# Patient Record
Sex: FEMALE | Race: OTHER | HISPANIC OR LATINO | URBAN - METROPOLITAN AREA
[De-identification: names, ages, dates, MRNs, and addresses within clinical notes are randomized per-mention and may not be internally consistent; named-entity substitution may affect disease eponyms.]

---

## 2019-01-15 VITALS
HEART RATE: 91 BPM | HEIGHT: 59 IN | OXYGEN SATURATION: 96 % | RESPIRATION RATE: 20 BRPM | DIASTOLIC BLOOD PRESSURE: 77 MMHG | SYSTOLIC BLOOD PRESSURE: 151 MMHG | TEMPERATURE: 98 F | WEIGHT: 164.69 LBS

## 2019-01-15 NOTE — PATIENT PROFILE ADULT - LANGUAGE ASSISTANCE NEEDED
Yes-Patient/Caregiver accepts free interpretation services... Yes-Patient/Caregiver accepts free interpretation services.../Profile completed with  Freddy (ID [id number])

## 2019-01-23 ENCOUNTER — INPATIENT (INPATIENT)
Facility: HOSPITAL | Age: 79
LOS: 4 days | Discharge: HOME | DRG: 470 | End: 2019-01-28
Payer: MEDICARE

## 2019-01-23 DIAGNOSIS — I10 ESSENTIAL (PRIMARY) HYPERTENSION: ICD-10-CM

## 2019-01-23 DIAGNOSIS — M17.12 UNILATERAL PRIMARY OSTEOARTHRITIS, LEFT KNEE: ICD-10-CM

## 2019-01-23 LAB
APTT BLD: 30.6 SEC — SIGNIFICANT CHANGE UP (ref 27.5–36.3)
MRSA PCR RESULT.: NEGATIVE — SIGNIFICANT CHANGE UP
S AUREUS DNA NOSE QL NAA+PROBE: NEGATIVE — SIGNIFICANT CHANGE UP

## 2019-01-23 PROCEDURE — 73560 X-RAY EXAM OF KNEE 1 OR 2: CPT | Mod: 26,LT

## 2019-01-23 RX ORDER — ACETAMINOPHEN 500 MG
650 TABLET ORAL EVERY 6 HOURS
Qty: 0 | Refills: 0 | Status: DISCONTINUED | OUTPATIENT
Start: 2019-01-23 | End: 2019-01-28

## 2019-01-23 RX ORDER — SODIUM CHLORIDE 9 MG/ML
1000 INJECTION, SOLUTION INTRAVENOUS
Qty: 0 | Refills: 0 | Status: DISCONTINUED | OUTPATIENT
Start: 2019-01-23 | End: 2019-01-28

## 2019-01-23 RX ORDER — CEFAZOLIN SODIUM 1 G
2000 VIAL (EA) INJECTION EVERY 8 HOURS
Qty: 0 | Refills: 0 | Status: DISCONTINUED | OUTPATIENT
Start: 2019-01-23 | End: 2019-01-23

## 2019-01-23 RX ORDER — ONDANSETRON 8 MG/1
4 TABLET, FILM COATED ORAL EVERY 6 HOURS
Qty: 0 | Refills: 0 | Status: DISCONTINUED | OUTPATIENT
Start: 2019-01-23 | End: 2019-01-28

## 2019-01-23 RX ORDER — POLYETHYLENE GLYCOL 3350 17 G/17G
17 POWDER, FOR SOLUTION ORAL DAILY
Qty: 0 | Refills: 0 | Status: DISCONTINUED | OUTPATIENT
Start: 2019-01-23 | End: 2019-01-28

## 2019-01-23 RX ORDER — SENNA PLUS 8.6 MG/1
2 TABLET ORAL AT BEDTIME
Qty: 0 | Refills: 0 | Status: DISCONTINUED | OUTPATIENT
Start: 2019-01-23 | End: 2019-01-28

## 2019-01-23 RX ORDER — CEFAZOLIN SODIUM 1 G
2000 VIAL (EA) INJECTION EVERY 8 HOURS
Qty: 0 | Refills: 0 | Status: COMPLETED | OUTPATIENT
Start: 2019-01-23 | End: 2019-01-24

## 2019-01-23 RX ORDER — AMLODIPINE BESYLATE 2.5 MG/1
10 TABLET ORAL DAILY
Qty: 0 | Refills: 0 | Status: DISCONTINUED | OUTPATIENT
Start: 2019-01-23 | End: 2019-01-28

## 2019-01-23 RX ORDER — HYDROMORPHONE HYDROCHLORIDE 2 MG/ML
0.5 INJECTION INTRAMUSCULAR; INTRAVENOUS; SUBCUTANEOUS EVERY 4 HOURS
Qty: 0 | Refills: 0 | Status: DISCONTINUED | OUTPATIENT
Start: 2019-01-23 | End: 2019-01-28

## 2019-01-23 RX ORDER — MAGNESIUM HYDROXIDE 400 MG/1
30 TABLET, CHEWABLE ORAL DAILY
Qty: 0 | Refills: 0 | Status: DISCONTINUED | OUTPATIENT
Start: 2019-01-23 | End: 2019-01-28

## 2019-01-23 RX ORDER — HYDROCHLOROTHIAZIDE 25 MG
100 TABLET ORAL DAILY
Qty: 0 | Refills: 0 | Status: DISCONTINUED | OUTPATIENT
Start: 2019-01-23 | End: 2019-01-28

## 2019-01-23 RX ORDER — LOSARTAN POTASSIUM 100 MG/1
12.5 TABLET, FILM COATED ORAL DAILY
Qty: 0 | Refills: 0 | Status: DISCONTINUED | OUTPATIENT
Start: 2019-01-23 | End: 2019-01-28

## 2019-01-23 RX ORDER — ASPIRIN/CALCIUM CARB/MAGNESIUM 324 MG
325 TABLET ORAL
Qty: 0 | Refills: 0 | Status: DISCONTINUED | OUTPATIENT
Start: 2019-01-23 | End: 2019-01-28

## 2019-01-23 RX ORDER — HYDROMORPHONE HYDROCHLORIDE 2 MG/ML
0.5 INJECTION INTRAMUSCULAR; INTRAVENOUS; SUBCUTANEOUS
Qty: 0 | Refills: 0 | Status: DISCONTINUED | OUTPATIENT
Start: 2019-01-23 | End: 2019-01-28

## 2019-01-23 RX ORDER — AMLODIPINE BESYLATE 2.5 MG/1
1 TABLET ORAL
Qty: 0 | Refills: 0 | COMMUNITY

## 2019-01-23 RX ORDER — DOCUSATE SODIUM 100 MG
100 CAPSULE ORAL THREE TIMES A DAY
Qty: 0 | Refills: 0 | Status: DISCONTINUED | OUTPATIENT
Start: 2019-01-23 | End: 2019-01-28

## 2019-01-23 RX ORDER — OXYCODONE AND ACETAMINOPHEN 5; 325 MG/1; MG/1
1 TABLET ORAL EVERY 4 HOURS
Qty: 0 | Refills: 0 | Status: DISCONTINUED | OUTPATIENT
Start: 2019-01-23 | End: 2019-01-28

## 2019-01-23 RX ORDER — PANTOPRAZOLE SODIUM 20 MG/1
40 TABLET, DELAYED RELEASE ORAL
Qty: 0 | Refills: 0 | Status: DISCONTINUED | OUTPATIENT
Start: 2019-01-23 | End: 2019-01-28

## 2019-01-23 RX ADMIN — HYDROMORPHONE HYDROCHLORIDE 0.5 MILLIGRAM(S): 2 INJECTION INTRAMUSCULAR; INTRAVENOUS; SUBCUTANEOUS at 17:19

## 2019-01-23 RX ADMIN — HYDROMORPHONE HYDROCHLORIDE 0.5 MILLIGRAM(S): 2 INJECTION INTRAMUSCULAR; INTRAVENOUS; SUBCUTANEOUS at 22:15

## 2019-01-23 RX ADMIN — HYDROMORPHONE HYDROCHLORIDE 0.5 MILLIGRAM(S): 2 INJECTION INTRAMUSCULAR; INTRAVENOUS; SUBCUTANEOUS at 14:30

## 2019-01-23 RX ADMIN — HYDROMORPHONE HYDROCHLORIDE 0.5 MILLIGRAM(S): 2 INJECTION INTRAMUSCULAR; INTRAVENOUS; SUBCUTANEOUS at 15:00

## 2019-01-23 RX ADMIN — HYDROMORPHONE HYDROCHLORIDE 0.5 MILLIGRAM(S): 2 INJECTION INTRAMUSCULAR; INTRAVENOUS; SUBCUTANEOUS at 17:27

## 2019-01-23 RX ADMIN — HYDROMORPHONE HYDROCHLORIDE 0.5 MILLIGRAM(S): 2 INJECTION INTRAMUSCULAR; INTRAVENOUS; SUBCUTANEOUS at 21:56

## 2019-01-23 RX ADMIN — Medication 100 MILLIGRAM(S): at 21:56

## 2019-01-23 RX ADMIN — Medication 2000 MILLIGRAM(S): at 18:00

## 2019-01-23 RX ADMIN — Medication 325 MILLIGRAM(S): at 18:50

## 2019-01-23 NOTE — H&P ADULT - NSHPLABSRESULTS_GEN_ALL_CORE
Preop CBC/CMP/PT//INR/UA - WNL, reviewed by medical clearance  PTT - day of surgery   Preop EKG - WNL, NSR reviewed by medical clearance  CXR - WNL reviewed by medical clearance  Nasal swab - day of surgery

## 2019-01-23 NOTE — PHYSICAL THERAPY INITIAL EVALUATION ADULT - CRITERIA FOR SKILLED THERAPEUTIC INTERVENTIONS
anticipated equipment needs at discharge/anticipated discharge recommendation/impairments found/therapy frequency/predicted duration of therapy intervention/functional limitations in following categories/risk reduction/prevention

## 2019-01-23 NOTE — H&P ADULT - PROBLEM SELECTOR PLAN 1
Admit to Orthopaedic Service.  Presents today for elective left total knee replacement   Pt medically stable and cleared for procedure today by Dr. Salgado

## 2019-01-23 NOTE — PHYSICAL THERAPY INITIAL EVALUATION ADULT - GAIT DEVIATIONS NOTED, PT EVAL
decreased velocity of limb motion/decreased step length/decreased stride length/increased time in double stance/decreased swing-to-stance ratio

## 2019-01-23 NOTE — H&P ADULT - HISTORY OF PRESENT ILLNESS
78 year old female presents with left knee pain x 10 years. The patient states preceding accident/injury/trauma to the knee and states that her symptoms are due to arthritis. The patient complains of pain that is localized to her left knee and denies radiation of pain. She states that she occasionally has numbness/tingling in her right leg but denies numbness/tingling down her left lower extremity. The patient ambulates with a cane. Denies hx of DVT.   Presents today for elective left total knee replacement.

## 2019-01-23 NOTE — PHYSICAL THERAPY INITIAL EVALUATION ADULT - PERTINENT HX OF CURRENT PROBLEM, REHAB EVAL
78 year old female presents with left knee pain x 10 years. The patient states preceding accident/injury/trauma to the knee and states that her symptoms are due to arthritis. The patient complains of pain that is localized to her left knee and denies radiation of pain. She states that she occasionally has numbness/tingling in her right leg but denies numbness/tingling down her left lower extremity. The patient ambulates with a cane. Denies hx of DVT. Now s/p left TKR.

## 2019-01-23 NOTE — PHYSICAL THERAPY INITIAL EVALUATION ADULT - PLANNED THERAPY INTERVENTIONS, PT EVAL
balance training/bed mobility training/strengthening/ROM/transfer training/gait training/postural re-education

## 2019-01-23 NOTE — H&P ADULT - NSHPPHYSICALEXAM_GEN_ALL_CORE
Gen: NAD  MSK: No deformity or open wounds. No rashes or lesions. EHL/TA/GS/FHL 5/5 BLE. Sensation intact and equal BLE. Skin warm and well perfused. DP palpable BLE. Decreased range of motion left knee secondary to pain.  Remainder of physical exam as per medical clearance note

## 2019-01-23 NOTE — PROGRESS NOTE ADULT - SUBJECTIVE AND OBJECTIVE BOX
Orthopedics Post Op Check    Procedure: Dr. Nye  Surgeon: left TKA     Pt comfortable, without complaints. Pain well controlled in PACU   Denies CP, SOB, N/V, numbness/tingling     Vital Signs Last 24 Hrs  T(C): 36.1 (23 Jan 2019 12:28), Max: 36.1 (23 Jan 2019 12:28)  T(F): 97 (23 Jan 2019 12:28), Max: 97 (23 Jan 2019 12:28)  HR: 92 (23 Jan 2019 14:53) (68 - 98)  BP: 145/73 (23 Jan 2019 14:53) (130/60 - 163/88)  BP(mean): 103 (23 Jan 2019 14:53) (87 - 119)  RR: 11 (23 Jan 2019 14:53) (11 - 19)  SpO2: 96% (23 Jan 2019 14:53) (96% - 100%)  AVSS, NAD      General: Pt Alert and oriented, comfortable  Dressing C/D/I, cryocuff in place  Sensation intact and equal distally BLE   Motor ehl/ta/gs/fhl 5/5 BLE   Skin warm and well perfused    Post op XR: s/p left TKA hardware in place    A/P: 78yFemale POD#0 s/p left TKA doing well in PACU   - Stable  - Pain Control  - DVT ppx: ASA BID   - Rufina-op abx: Ancef  - PT, WBS: WBAT   - F/U AM Labs

## 2019-01-23 NOTE — BRIEF OPERATIVE NOTE - PROCEDURE
<<-----Click on this checkbox to enter Procedure Total knee arthroplasty  01/23/2019    Active  [id number]

## 2019-01-23 NOTE — PHYSICAL THERAPY INITIAL EVALUATION ADULT - ADDITIONAL COMMENTS
Patient lives with daughter in private house with steps. Ambulates with SC at baseline. Denies falls.

## 2019-01-23 NOTE — PHYSICAL THERAPY INITIAL EVALUATION ADULT - MODALITIES TREATMENT COMMENTS
Supine, bilateral LEs: glute sets, quad sets, heel slides, ankle pumps (all through full range, 1 set, 10 reps). Patient tolerated therex well. Educated patient to perform therex regimen 3-5x/day, patient verbalized understanding; written handout provided.

## 2019-01-24 LAB
ANION GAP SERPL CALC-SCNC: 12 MMOL/L — SIGNIFICANT CHANGE UP (ref 5–17)
BUN SERPL-MCNC: 22 MG/DL — SIGNIFICANT CHANGE UP (ref 7–23)
CALCIUM SERPL-MCNC: 9 MG/DL — SIGNIFICANT CHANGE UP (ref 8.4–10.5)
CHLORIDE SERPL-SCNC: 102 MMOL/L — SIGNIFICANT CHANGE UP (ref 96–108)
CO2 SERPL-SCNC: 26 MMOL/L — SIGNIFICANT CHANGE UP (ref 22–31)
CREAT SERPL-MCNC: 0.82 MG/DL — SIGNIFICANT CHANGE UP (ref 0.5–1.3)
GLUCOSE SERPL-MCNC: 146 MG/DL — HIGH (ref 70–99)
HCT VFR BLD CALC: 33.6 % — LOW (ref 34.5–45)
HGB BLD-MCNC: 10.8 G/DL — LOW (ref 11.5–15.5)
MCHC RBC-ENTMCNC: 27.1 PG — SIGNIFICANT CHANGE UP (ref 27–34)
MCHC RBC-ENTMCNC: 32.1 G/DL — SIGNIFICANT CHANGE UP (ref 32–36)
MCV RBC AUTO: 84.4 FL — SIGNIFICANT CHANGE UP (ref 80–100)
PLATELET # BLD AUTO: 270 K/UL — SIGNIFICANT CHANGE UP (ref 150–400)
POTASSIUM SERPL-MCNC: 3.8 MMOL/L — SIGNIFICANT CHANGE UP (ref 3.5–5.3)
POTASSIUM SERPL-SCNC: 3.8 MMOL/L — SIGNIFICANT CHANGE UP (ref 3.5–5.3)
RBC # BLD: 3.98 M/UL — SIGNIFICANT CHANGE UP (ref 3.8–5.2)
RBC # FLD: 14.4 % — SIGNIFICANT CHANGE UP (ref 10.3–16.9)
SODIUM SERPL-SCNC: 140 MMOL/L — SIGNIFICANT CHANGE UP (ref 135–145)
SURGICAL PATHOLOGY STUDY: SIGNIFICANT CHANGE UP
WBC # BLD: 15.6 K/UL — HIGH (ref 3.8–10.5)
WBC # FLD AUTO: 15.6 K/UL — HIGH (ref 3.8–10.5)

## 2019-01-24 RX ADMIN — HYDROMORPHONE HYDROCHLORIDE 0.5 MILLIGRAM(S): 2 INJECTION INTRAMUSCULAR; INTRAVENOUS; SUBCUTANEOUS at 15:43

## 2019-01-24 RX ADMIN — OXYCODONE AND ACETAMINOPHEN 1 TABLET(S): 5; 325 TABLET ORAL at 02:26

## 2019-01-24 RX ADMIN — OXYCODONE AND ACETAMINOPHEN 1 TABLET(S): 5; 325 TABLET ORAL at 13:43

## 2019-01-24 RX ADMIN — OXYCODONE AND ACETAMINOPHEN 1 TABLET(S): 5; 325 TABLET ORAL at 03:27

## 2019-01-24 RX ADMIN — Medication 100 MILLIGRAM(S): at 06:01

## 2019-01-24 RX ADMIN — Medication 325 MILLIGRAM(S): at 18:01

## 2019-01-24 RX ADMIN — OXYCODONE AND ACETAMINOPHEN 1 TABLET(S): 5; 325 TABLET ORAL at 14:43

## 2019-01-24 RX ADMIN — Medication 100 MILLIGRAM(S): at 21:18

## 2019-01-24 RX ADMIN — HYDROMORPHONE HYDROCHLORIDE 0.5 MILLIGRAM(S): 2 INJECTION INTRAMUSCULAR; INTRAVENOUS; SUBCUTANEOUS at 15:56

## 2019-01-24 RX ADMIN — Medication 325 MILLIGRAM(S): at 06:01

## 2019-01-24 RX ADMIN — LOSARTAN POTASSIUM 12.5 MILLIGRAM(S): 100 TABLET, FILM COATED ORAL at 06:00

## 2019-01-24 RX ADMIN — POLYETHYLENE GLYCOL 3350 17 GRAM(S): 17 POWDER, FOR SOLUTION ORAL at 13:41

## 2019-01-24 RX ADMIN — PANTOPRAZOLE SODIUM 40 MILLIGRAM(S): 20 TABLET, DELAYED RELEASE ORAL at 06:01

## 2019-01-24 RX ADMIN — Medication 2000 MILLIGRAM(S): at 02:07

## 2019-01-24 NOTE — DISCHARGE NOTE ADULT - PATIENT PORTAL LINK FT
You can access the [facility] Patient Portal, offered by [facility], by registering with the following website: [email or web address]

## 2019-01-24 NOTE — DISCHARGE NOTE ADULT - CARE PLAN
Principal Discharge DX:	Primary osteoarthritis of left knee  Goal:	improvement s/p Left TKR  Assessment and plan of treatment:	Weight bear as tolerated with assistive device.  No strenuous activity, heavy lifting, driving or returning to work until cleared by MD.  You may shower - dressing is water-resistant, no soaking in bathtubs.  Remove dressing after post op day 5-7, then leave incision open to air. Keep incision clean and dry.  Try to have regular bowel movements, take stool softener or laxative if necessary.  May take Pepcid or Zantac for upset stomach.  Swelling may travel all the way down leg to foot, this is normal and will subside in a few weeks.  Call to schedule an appt with Dr. Nye for follow up, if you have staples or sutures they will be removed in office.  Contact your doctor if you experience: fever greater than 101.5, chills, chest pain, difficulty breathing, redness or excessive drainage around the incision, other concerns.  Follow up with your primary care provider. Principal Discharge DX:	Primary osteoarthritis of left knee  Goal:	improvement s/p Left TKR  Assessment and plan of treatment:	Weight bear as tolerated with assistive device.  No strenuous activity, heavy lifting, driving or returning to work until cleared by MD.  You may shower - dressing is water-resistant, no soaking in bathtubs.  Remove dressing after post op day 7, then leave incision open to air. Keep incision clean and dry.  Try to have regular bowel movements, take stool softener or laxative if necessary.  May take Pepcid or Zantac for upset stomach.  Swelling may travel all the way down leg to foot, this is normal and will subside in a few weeks.  Call to schedule an appt with Dr. Nye for follow up, if you have staples or sutures they will be removed in office.  Contact your doctor if you experience: fever greater than 101.5, chills, chest pain, difficulty breathing, redness or excessive drainage around the incision, other concerns.  Follow up with your primary care provider. Principal Discharge DX:	Primary osteoarthritis of left knee  Goal:	improvement s/p Left TKA  Assessment and plan of treatment:	Weight bear as tolerated with assistive device.  No strenuous activity, heavy lifting, driving or returning to work until cleared by MD.  You may shower - dressing is water-resistant, no soaking in bathtubs.  Remove dressing after post op day 7, then leave incision open to air. Keep incision clean and dry.  Try to have regular bowel movements, take stool softener or laxative if necessary.  May take Pepcid or Zantac for upset stomach.  Swelling may travel all the way down leg to foot, this is normal and will subside in a few weeks.  Call to schedule an appt with Dr. Nye for follow up, if you have staples or sutures they will be removed in office.  Contact your doctor if you experience: fever greater than 101.5, chills, chest pain, difficulty breathing, redness or excessive drainage around the incision, other concerns.  Follow up with your primary care provider.

## 2019-01-24 NOTE — PROGRESS NOTE ADULT - SUBJECTIVE AND OBJECTIVE BOX
Ortho Note    Pt comfortable without complaints, pain controlled  Denies CP, SOB, N/V, numbness/tingling     Vital Signs Last 24 Hrs  AVSS    General: Pt Alert and oriented, NAD  DSG C/D/I left knee  Pulses intact LLE  Sensation intact LLE  Motor: EHL/FHL/TA/GS 5/5 LLE                          10.8   15.6  )-----------( 270      ( 24 Jan 2019 06:18 )             33.6   24 Jan 2019 06:18    140    |  102    |  22     ----------------------------<  146    3.8     |  26     |  0.82           A/P: 78yFemale POD#1 s/p Left TKR  - Stable  - Pain Control  - DVT ppx: asa  - PT, WBS: wbat    Ortho Pager [phone]

## 2019-01-24 NOTE — DISCHARGE NOTE ADULT - MEDICATION SUMMARY - MEDICATIONS TO TAKE
I will START or STAY ON the medications listed below when I get home from the hospital:    oxycodone-acetaminophen 5 mg-325 mg oral tablet  -- 1 tab(s) by mouth every 4 hours, as needed, pain MDD:6    -- Caution federal law prohibits the transfer of this drug to any person other  than the person for whom it was prescribed.  May cause drowsiness.  Alcohol may intensify this effect.  Use care when operating dangerous machinery.  This prescription cannot be refilled.  This product contains acetaminophen.  Do not use  with any other product containing acetaminophen to prevent possible liver damage.  Using more of this medication than prescribed may cause serious breathing problems.    -- Indication: For moderate to severe pain    CeleBREX 200 mg oral capsule  -- 1 cap(s) by mouth 2 times a day. Take after breakfast and dinner   -- Do not take this drug if you are pregnant.  Medication should be taken with plenty of water.  Obtain medical advice before taking any non-prescription drugs as some may affect the action of this medication.  Take with food or milk.    -- Indication: For antiinflammatory (take after breakfast and dinner)    Ecotrin 325 mg oral delayed release tablet  -- 1 tab(s) by mouth 2 times a day.  Take after breakfast and dinner  -- Swallow whole.  Do not crush.  Take with food or milk.    -- Indication: For Prevent blood clots (take after breakfast and dinner)    Hyzaar 100 mg-12.5 mg oral tablet  -- 1 tab(s) by mouth once a day  -- Indication: For Hypertension    Norvasc 10 mg oral tablet  -- 1 tab(s) by mouth once a day  -- Indication: For Hypertension    bisacodyl 10 mg rectal suppository  -- 1 suppository(ies) rectally once a day, As needed, If no bowel movement by postoperative day #2  -- Indication: For constipation    docusate sodium 100 mg oral capsule  -- 1 cap(s) by mouth 3 times a day  -- Indication: For constipation    polyethylene glycol 3350 oral powder for reconstitution  -- 17 gram(s) by mouth once a day  -- Indication: For constipation    senna oral tablet  -- 2 tab(s) by mouth once a day (at bedtime), As needed, Constipation  -- Indication: For constipation

## 2019-01-24 NOTE — DISCHARGE NOTE ADULT - HOSPITAL COURSE
Admitted  Surgery  Rufina-op Antibiotics  Pain control  DVT prophylaxis  OOB/Physical Therapy Admitted  Surgery L TKA 1/23/19  Rufina-op Antibiotics  Pain control  DVT prophylaxis  OOB/Physical Therapy

## 2019-01-24 NOTE — DISCHARGE NOTE ADULT - NS AS ACTIVITY OBS
Walking-Indoors allowed/Walking-Outdoors allowed/Stairs allowed/No Heavy lifting/straining/Showering allowed/Do not drive or operate machinery

## 2019-01-24 NOTE — PROGRESS NOTE ADULT - SUBJECTIVE AND OBJECTIVE BOX
Orthopedics Progress note    Pt comfortable, without complaints. Pain well controlled in PACU   Denies CP, SOB, N/V, numbness/tingling     Vital Signs Last 24 Hrs  T(C): 36.4 (24 Jan 2019 05:25), Max: 37.1 (23 Jan 2019 20:45)  T(F): 97.5 (24 Jan 2019 05:25), Max: 98.7 (23 Jan 2019 20:45)  HR: 92 (24 Jan 2019 05:25) (68 - 109)  BP: 135/68 (24 Jan 2019 05:25) (127/65 - 164/85)  BP(mean): 103 (23 Jan 2019 15:53) (87 - 119)  RR: 17 (24 Jan 2019 05:25) (11 - 19)  SpO2: 95% (24 Jan 2019 05:25) (95% - 100%)  AVSS, NAD      General: Pt Alert and oriented, comfortable  Dressing C/D/I, cryocuff in place  Sensation intact and equal distally BLE   Motor ehl/ta/gs/fhl 5/5 BLE   Skin warm and well perfused        A/P: 78yFemale POD#1 s/p left TKA doing well   - Stable  - Pain Control  - DVT ppx: ASA BID   - Rufnia-op abx: Ancef  - PT, WBS: WBAT   - F/U AM Labs  - Dispo: pending

## 2019-01-24 NOTE — DISCHARGE NOTE ADULT - CARE PROVIDER_API CALL
Hansel Nye), Orthopaedic Surgery  [address]  [address] [place] [address]  Phone: [phone]  Fax: [phone]

## 2019-01-24 NOTE — DISCHARGE NOTE ADULT - PLAN OF CARE
improvement s/p Left TKR Weight bear as tolerated with assistive device.  No strenuous activity, heavy lifting, driving or returning to work until cleared by MD.  You may shower - dressing is water-resistant, no soaking in bathtubs.  Remove dressing after post op day 5-7, then leave incision open to air. Keep incision clean and dry.  Try to have regular bowel movements, take stool softener or laxative if necessary.  May take Pepcid or Zantac for upset stomach.  Swelling may travel all the way down leg to foot, this is normal and will subside in a few weeks.  Call to schedule an appt with Dr. Nye for follow up, if you have staples or sutures they will be removed in office.  Contact your doctor if you experience: fever greater than 101.5, chills, chest pain, difficulty breathing, redness or excessive drainage around the incision, other concerns.  Follow up with your primary care provider. Weight bear as tolerated with assistive device.  No strenuous activity, heavy lifting, driving or returning to work until cleared by MD.  You may shower - dressing is water-resistant, no soaking in bathtubs.  Remove dressing after post op day 7, then leave incision open to air. Keep incision clean and dry.  Try to have regular bowel movements, take stool softener or laxative if necessary.  May take Pepcid or Zantac for upset stomach.  Swelling may travel all the way down leg to foot, this is normal and will subside in a few weeks.  Call to schedule an appt with Dr. Nye for follow up, if you have staples or sutures they will be removed in office.  Contact your doctor if you experience: fever greater than 101.5, chills, chest pain, difficulty breathing, redness or excessive drainage around the incision, other concerns.  Follow up with your primary care provider. improvement s/p Left TKA

## 2019-01-25 LAB
HCT VFR BLD CALC: 33.2 % — LOW (ref 34.5–45)
HGB BLD-MCNC: 10.6 G/DL — LOW (ref 11.5–15.5)
MCHC RBC-ENTMCNC: 27.2 PG — SIGNIFICANT CHANGE UP (ref 27–34)
MCHC RBC-ENTMCNC: 31.9 G/DL — LOW (ref 32–36)
MCV RBC AUTO: 85.3 FL — SIGNIFICANT CHANGE UP (ref 80–100)
PLATELET # BLD AUTO: 240 K/UL — SIGNIFICANT CHANGE UP (ref 150–400)
RBC # BLD: 3.89 M/UL — SIGNIFICANT CHANGE UP (ref 3.8–5.2)
RBC # FLD: 14.5 % — SIGNIFICANT CHANGE UP (ref 10.3–16.9)
WBC # BLD: 13.4 K/UL — HIGH (ref 3.8–10.5)
WBC # FLD AUTO: 13.4 K/UL — HIGH (ref 3.8–10.5)

## 2019-01-25 RX ADMIN — OXYCODONE AND ACETAMINOPHEN 1 TABLET(S): 5; 325 TABLET ORAL at 21:28

## 2019-01-25 RX ADMIN — Medication 325 MILLIGRAM(S): at 05:50

## 2019-01-25 RX ADMIN — LOSARTAN POTASSIUM 12.5 MILLIGRAM(S): 100 TABLET, FILM COATED ORAL at 05:49

## 2019-01-25 RX ADMIN — PANTOPRAZOLE SODIUM 40 MILLIGRAM(S): 20 TABLET, DELAYED RELEASE ORAL at 05:50

## 2019-01-25 RX ADMIN — HYDROMORPHONE HYDROCHLORIDE 0.5 MILLIGRAM(S): 2 INJECTION INTRAMUSCULAR; INTRAVENOUS; SUBCUTANEOUS at 05:50

## 2019-01-25 RX ADMIN — OXYCODONE AND ACETAMINOPHEN 1 TABLET(S): 5; 325 TABLET ORAL at 16:24

## 2019-01-25 RX ADMIN — HYDROMORPHONE HYDROCHLORIDE 0.5 MILLIGRAM(S): 2 INJECTION INTRAMUSCULAR; INTRAVENOUS; SUBCUTANEOUS at 06:10

## 2019-01-25 RX ADMIN — OXYCODONE AND ACETAMINOPHEN 1 TABLET(S): 5; 325 TABLET ORAL at 01:08

## 2019-01-25 RX ADMIN — OXYCODONE AND ACETAMINOPHEN 1 TABLET(S): 5; 325 TABLET ORAL at 15:43

## 2019-01-25 RX ADMIN — OXYCODONE AND ACETAMINOPHEN 1 TABLET(S): 5; 325 TABLET ORAL at 10:48

## 2019-01-25 RX ADMIN — OXYCODONE AND ACETAMINOPHEN 1 TABLET(S): 5; 325 TABLET ORAL at 02:08

## 2019-01-25 RX ADMIN — Medication 325 MILLIGRAM(S): at 17:25

## 2019-01-25 RX ADMIN — OXYCODONE AND ACETAMINOPHEN 1 TABLET(S): 5; 325 TABLET ORAL at 22:22

## 2019-01-25 RX ADMIN — OXYCODONE AND ACETAMINOPHEN 1 TABLET(S): 5; 325 TABLET ORAL at 11:24

## 2019-01-25 NOTE — PROGRESS NOTE ADULT - SUBJECTIVE AND OBJECTIVE BOX
Ortho Note    Post-operative day #2 s/p left total knee replacement     Subjective:     Patient seen and examined. Patient comfortable without complaints, pain controlled.  Denies chest pain, SOB, N/V, numbness/tingling.    Objective:    Vital Signs Last 24 Hrs  T(C): 37.8 (01-25-19 @ 08:27), Max: 37.8 (01-25-19 @ 08:27)  T(F): 100.1 (01-25-19 @ 08:27), Max: 100.1 (01-25-19 @ 08:27)  HR: 98 (01-25-19 @ 08:27) (98 - 98)  BP: 157/77 (01-25-19 @ 08:27) (157/77 - 157/77)  RR: 15 (01-25-19 @ 08:27) (15 - 15)  SpO2: 100% (01-25-19 @ 08:27) (100% - 100%)      PE:  General: Patient alert and oriented, NAD  Dressing: C/D/I left knee  Pulses: DP palpable LLE  Sensation: intact and equal to BLE  Motor: EHL/FHL/TA/GS 5/5 BLE                           10.6   13.4  )-----------( 240      ( 25 Jan 2019 06:02 )             33.2   24 Jan 2019 06:18    140    |  102    |  22     ----------------------------<  146    3.8     |  26     |  0.82           A/P: 78yFemale POD#2 s/p left total knee replacement   1. Pain control as needed  2. DVT prophylaxis: ASA  3. PT, WBS: WBAT  4. Dispo: home pending PT clearance     Ortho Pager [phone]

## 2019-01-25 NOTE — PROGRESS NOTE ADULT - SUBJECTIVE AND OBJECTIVE BOX
Ortho Note    Pt comfortable without complaints, pain controlled  Denies CP, SOB, N/V, numbness/tingling     Vital Signs Last 24 Hrs  AVSS    General: Pt Alert and oriented, NAD  DSG C/D/I left knee  Pulses intact LLE  Sensation intact LLE  Motor: EHL/FHL/TA/GS 5/5 LLE                          10.8   15.6  )-----------( 270      ( 24 Jan 2019 06:18 )             33.6   24 Jan 2019 06:18    140    |  102    |  22     ----------------------------<  146    3.8     |  26     |  0.82           A/P: 78yFemale POD#2 s/p Left TKR  - bulky dressing removed  - Stable  - Pain Control  - DVT ppx: asa  - PT, WBS: wbat  - dispo: Home    Ortho Pager [phone]

## 2019-01-26 LAB
ANION GAP SERPL CALC-SCNC: 13 MMOL/L — SIGNIFICANT CHANGE UP (ref 5–17)
APPEARANCE UR: CLEAR — SIGNIFICANT CHANGE UP
BILIRUB UR-MCNC: NEGATIVE — SIGNIFICANT CHANGE UP
BUN SERPL-MCNC: 23 MG/DL — SIGNIFICANT CHANGE UP (ref 7–23)
CALCIUM SERPL-MCNC: 8.6 MG/DL — SIGNIFICANT CHANGE UP (ref 8.4–10.5)
CHLORIDE SERPL-SCNC: 96 MMOL/L — SIGNIFICANT CHANGE UP (ref 96–108)
CO2 SERPL-SCNC: 28 MMOL/L — SIGNIFICANT CHANGE UP (ref 22–31)
COLOR SPEC: YELLOW — SIGNIFICANT CHANGE UP
CREAT SERPL-MCNC: 1.1 MG/DL — SIGNIFICANT CHANGE UP (ref 0.5–1.3)
DIFF PNL FLD: ABNORMAL
GLUCOSE SERPL-MCNC: 181 MG/DL — HIGH (ref 70–99)
GLUCOSE UR QL: NEGATIVE — SIGNIFICANT CHANGE UP
HCT VFR BLD CALC: 29.8 % — LOW (ref 34.5–45)
HGB BLD-MCNC: 9.5 G/DL — LOW (ref 11.5–15.5)
KETONES UR-MCNC: NEGATIVE — SIGNIFICANT CHANGE UP
LEUKOCYTE ESTERASE UR-ACNC: NEGATIVE — SIGNIFICANT CHANGE UP
MCHC RBC-ENTMCNC: 27.3 PG — SIGNIFICANT CHANGE UP (ref 27–34)
MCHC RBC-ENTMCNC: 31.9 G/DL — LOW (ref 32–36)
MCV RBC AUTO: 85.6 FL — SIGNIFICANT CHANGE UP (ref 80–100)
NITRITE UR-MCNC: NEGATIVE — SIGNIFICANT CHANGE UP
PH UR: 5.5 — SIGNIFICANT CHANGE UP (ref 5–8)
PLATELET # BLD AUTO: 262 K/UL — SIGNIFICANT CHANGE UP (ref 150–400)
POTASSIUM SERPL-MCNC: 3.5 MMOL/L — SIGNIFICANT CHANGE UP (ref 3.5–5.3)
POTASSIUM SERPL-SCNC: 3.5 MMOL/L — SIGNIFICANT CHANGE UP (ref 3.5–5.3)
PROT UR-MCNC: NEGATIVE MG/DL — SIGNIFICANT CHANGE UP
RBC # BLD: 3.48 M/UL — LOW (ref 3.8–5.2)
RBC # FLD: 14.5 % — SIGNIFICANT CHANGE UP (ref 10.3–16.9)
SODIUM SERPL-SCNC: 137 MMOL/L — SIGNIFICANT CHANGE UP (ref 135–145)
SP GR SPEC: 1.02 — SIGNIFICANT CHANGE UP (ref 1–1.03)
UROBILINOGEN FLD QL: 0.2 E.U./DL — SIGNIFICANT CHANGE UP
WBC # BLD: 12.2 K/UL — HIGH (ref 3.8–10.5)
WBC # FLD AUTO: 12.2 K/UL — HIGH (ref 3.8–10.5)

## 2019-01-26 PROCEDURE — 71045 X-RAY EXAM CHEST 1 VIEW: CPT | Mod: 26

## 2019-01-26 RX ORDER — OXYCODONE AND ACETAMINOPHEN 5; 325 MG/1; MG/1
1 TABLET ORAL ONCE
Qty: 0 | Refills: 0 | Status: DISCONTINUED | OUTPATIENT
Start: 2019-01-26 | End: 2019-01-26

## 2019-01-26 RX ADMIN — OXYCODONE AND ACETAMINOPHEN 1 TABLET(S): 5; 325 TABLET ORAL at 15:00

## 2019-01-26 RX ADMIN — AMLODIPINE BESYLATE 10 MILLIGRAM(S): 2.5 TABLET ORAL at 05:16

## 2019-01-26 RX ADMIN — OXYCODONE AND ACETAMINOPHEN 1 TABLET(S): 5; 325 TABLET ORAL at 17:19

## 2019-01-26 RX ADMIN — LOSARTAN POTASSIUM 12.5 MILLIGRAM(S): 100 TABLET, FILM COATED ORAL at 05:16

## 2019-01-26 RX ADMIN — Medication 325 MILLIGRAM(S): at 17:19

## 2019-01-26 RX ADMIN — Medication 100 MILLIGRAM(S): at 22:26

## 2019-01-26 RX ADMIN — Medication 650 MILLIGRAM(S): at 16:20

## 2019-01-26 RX ADMIN — OXYCODONE AND ACETAMINOPHEN 1 TABLET(S): 5; 325 TABLET ORAL at 22:00

## 2019-01-26 RX ADMIN — OXYCODONE AND ACETAMINOPHEN 1 TABLET(S): 5; 325 TABLET ORAL at 23:00

## 2019-01-26 RX ADMIN — OXYCODONE AND ACETAMINOPHEN 1 TABLET(S): 5; 325 TABLET ORAL at 18:00

## 2019-01-26 RX ADMIN — PANTOPRAZOLE SODIUM 40 MILLIGRAM(S): 20 TABLET, DELAYED RELEASE ORAL at 06:18

## 2019-01-26 RX ADMIN — OXYCODONE AND ACETAMINOPHEN 1 TABLET(S): 5; 325 TABLET ORAL at 14:07

## 2019-01-26 RX ADMIN — OXYCODONE AND ACETAMINOPHEN 1 TABLET(S): 5; 325 TABLET ORAL at 08:44

## 2019-01-26 RX ADMIN — Medication 325 MILLIGRAM(S): at 05:16

## 2019-01-26 RX ADMIN — Medication 650 MILLIGRAM(S): at 17:05

## 2019-01-26 RX ADMIN — Medication 100 MILLIGRAM(S): at 05:16

## 2019-01-26 RX ADMIN — OXYCODONE AND ACETAMINOPHEN 1 TABLET(S): 5; 325 TABLET ORAL at 09:30

## 2019-01-26 NOTE — PROGRESS NOTE ADULT - SUBJECTIVE AND OBJECTIVE BOX
Ortho Note    Aquacell changed overnight.   Pt comfortable without complaints, pain controlled  Denies CP, SOB, N/V, numbness/tingling     Vital Signs Last 24 Hrs  T(C): 38.2 (26 Jan 2019 08:30), Max: 38.2 (26 Jan 2019 08:30)  T(F): 100.8 (26 Jan 2019 08:30), Max: 100.8 (26 Jan 2019 08:30)  HR: 110 (26 Jan 2019 08:30) (109 - 112)  BP: 133/64 (26 Jan 2019 08:30) (133/64 - 174/80)  BP(mean): --  RR: 15 (26 Jan 2019 08:30) (15 - 18)  SpO2: 96% (26 Jan 2019 08:30) (94% - 100%)      General: Pt Alert and oriented, NAD  DSG C/D/I left knee, aquacel  Pulses intact LLE  Sensation intact LLE  Motor: EHL/FHL/TA/GS 5/5 LLE        A/P: 78yFemale s/p Left TKR  - Stable  - Pain Control  - DVT ppx: asa  - PT, WBS: wbat  - dispo: Home pending PT progression    Ortho Pager [phone]

## 2019-01-27 LAB
ANION GAP SERPL CALC-SCNC: 9 MMOL/L — SIGNIFICANT CHANGE UP (ref 5–17)
BUN SERPL-MCNC: 27 MG/DL — HIGH (ref 7–23)
CALCIUM SERPL-MCNC: 8.8 MG/DL — SIGNIFICANT CHANGE UP (ref 8.4–10.5)
CHLORIDE SERPL-SCNC: 98 MMOL/L — SIGNIFICANT CHANGE UP (ref 96–108)
CO2 SERPL-SCNC: 30 MMOL/L — SIGNIFICANT CHANGE UP (ref 22–31)
CREAT SERPL-MCNC: 0.99 MG/DL — SIGNIFICANT CHANGE UP (ref 0.5–1.3)
GLUCOSE SERPL-MCNC: 118 MG/DL — HIGH (ref 70–99)
HCT VFR BLD CALC: 29.7 % — LOW (ref 34.5–45)
HGB BLD-MCNC: 9.4 G/DL — LOW (ref 11.5–15.5)
MCHC RBC-ENTMCNC: 26.9 PG — LOW (ref 27–34)
MCHC RBC-ENTMCNC: 31.6 G/DL — LOW (ref 32–36)
MCV RBC AUTO: 84.9 FL — SIGNIFICANT CHANGE UP (ref 80–100)
PLATELET # BLD AUTO: 254 K/UL — SIGNIFICANT CHANGE UP (ref 150–400)
POTASSIUM SERPL-MCNC: 3.3 MMOL/L — LOW (ref 3.5–5.3)
POTASSIUM SERPL-SCNC: 3.3 MMOL/L — LOW (ref 3.5–5.3)
RBC # BLD: 3.5 M/UL — LOW (ref 3.8–5.2)
RBC # FLD: 14.9 % — SIGNIFICANT CHANGE UP (ref 10.3–16.9)
SODIUM SERPL-SCNC: 137 MMOL/L — SIGNIFICANT CHANGE UP (ref 135–145)
WBC # BLD: 9.7 K/UL — SIGNIFICANT CHANGE UP (ref 3.8–10.5)
WBC # FLD AUTO: 9.7 K/UL — SIGNIFICANT CHANGE UP (ref 3.8–10.5)

## 2019-01-27 RX ORDER — OXYCODONE AND ACETAMINOPHEN 5; 325 MG/1; MG/1
2 TABLET ORAL EVERY 6 HOURS
Qty: 0 | Refills: 0 | Status: DISCONTINUED | OUTPATIENT
Start: 2019-01-27 | End: 2019-01-28

## 2019-01-27 RX ADMIN — Medication 100 MILLIGRAM(S): at 07:57

## 2019-01-27 RX ADMIN — OXYCODONE AND ACETAMINOPHEN 2 TABLET(S): 5; 325 TABLET ORAL at 23:45

## 2019-01-27 RX ADMIN — OXYCODONE AND ACETAMINOPHEN 2 TABLET(S): 5; 325 TABLET ORAL at 10:00

## 2019-01-27 RX ADMIN — Medication 325 MILLIGRAM(S): at 17:32

## 2019-01-27 RX ADMIN — OXYCODONE AND ACETAMINOPHEN 1 TABLET(S): 5; 325 TABLET ORAL at 17:32

## 2019-01-27 RX ADMIN — Medication 325 MILLIGRAM(S): at 06:44

## 2019-01-27 RX ADMIN — Medication 100 MILLIGRAM(S): at 06:44

## 2019-01-27 RX ADMIN — LOSARTAN POTASSIUM 12.5 MILLIGRAM(S): 100 TABLET, FILM COATED ORAL at 06:44

## 2019-01-27 RX ADMIN — PANTOPRAZOLE SODIUM 40 MILLIGRAM(S): 20 TABLET, DELAYED RELEASE ORAL at 06:44

## 2019-01-27 RX ADMIN — AMLODIPINE BESYLATE 10 MILLIGRAM(S): 2.5 TABLET ORAL at 06:44

## 2019-01-27 RX ADMIN — OXYCODONE AND ACETAMINOPHEN 1 TABLET(S): 5; 325 TABLET ORAL at 05:00

## 2019-01-27 RX ADMIN — POLYETHYLENE GLYCOL 3350 17 GRAM(S): 17 POWDER, FOR SOLUTION ORAL at 12:15

## 2019-01-27 RX ADMIN — OXYCODONE AND ACETAMINOPHEN 1 TABLET(S): 5; 325 TABLET ORAL at 04:51

## 2019-01-27 RX ADMIN — OXYCODONE AND ACETAMINOPHEN 2 TABLET(S): 5; 325 TABLET ORAL at 09:14

## 2019-01-27 RX ADMIN — OXYCODONE AND ACETAMINOPHEN 1 TABLET(S): 5; 325 TABLET ORAL at 18:24

## 2019-01-27 NOTE — PROGRESS NOTE ADULT - SUBJECTIVE AND OBJECTIVE BOX
Ortho Note    Progressing with PT. Pain meds adjsuted this am.   Pt comfortable without complaints, pain controlled  Denies CP, SOB, N/V, numbness/tingling     Vital Signs Last 24 Hrs  T(C): 36.7 (27 Jan 2019 08:40), Max: 38.4 (26 Jan 2019 16:36)  T(F): 98.1 (27 Jan 2019 08:40), Max: 101.1 (26 Jan 2019 16:36)  HR: 89 (27 Jan 2019 08:40) (87 - 115)  BP: 95/58 (27 Jan 2019 08:40) (95/58 - 115/67)  BP(mean): --  RR: 16 (27 Jan 2019 08:40) (14 - 18)  SpO2: 99% (27 Jan 2019 08:40) (97% - 99%)      General: Pt Alert and oriented, NAD  DSG C/D/I left knee, aquacel  Pulses intact LLE  Sensation intact LLE  Motor: EHL/FHL/TA/GS 5/5 LLE      A/P: 78yFemale s/p Left TKR  - Stable  - Pain Control  - DVT ppx: asa  - PT, WBS: wbat  - dispo: Home pending PT progression    Ortho Pager [phone]

## 2019-01-28 VITALS
DIASTOLIC BLOOD PRESSURE: 67 MMHG | SYSTOLIC BLOOD PRESSURE: 114 MMHG | HEART RATE: 78 BPM | TEMPERATURE: 98 F | OXYGEN SATURATION: 99 % | RESPIRATION RATE: 17 BRPM

## 2019-01-28 LAB
ANION GAP SERPL CALC-SCNC: 15 MMOL/L — SIGNIFICANT CHANGE UP (ref 5–17)
BUN SERPL-MCNC: 28 MG/DL — HIGH (ref 7–23)
CALCIUM SERPL-MCNC: 8.4 MG/DL — SIGNIFICANT CHANGE UP (ref 8.4–10.5)
CHLORIDE SERPL-SCNC: 96 MMOL/L — SIGNIFICANT CHANGE UP (ref 96–108)
CO2 SERPL-SCNC: 28 MMOL/L — SIGNIFICANT CHANGE UP (ref 22–31)
CREAT SERPL-MCNC: 1.06 MG/DL — SIGNIFICANT CHANGE UP (ref 0.5–1.3)
GLUCOSE SERPL-MCNC: 177 MG/DL — HIGH (ref 70–99)
HCT VFR BLD CALC: 27.2 % — LOW (ref 34.5–45)
HGB BLD-MCNC: 8.7 G/DL — LOW (ref 11.5–15.5)
MCHC RBC-ENTMCNC: 27.4 PG — SIGNIFICANT CHANGE UP (ref 27–34)
MCHC RBC-ENTMCNC: 32 G/DL — SIGNIFICANT CHANGE UP (ref 32–36)
MCV RBC AUTO: 85.8 FL — SIGNIFICANT CHANGE UP (ref 80–100)
PLATELET # BLD AUTO: 313 K/UL — SIGNIFICANT CHANGE UP (ref 150–400)
POTASSIUM SERPL-MCNC: 3.4 MMOL/L — LOW (ref 3.5–5.3)
POTASSIUM SERPL-SCNC: 3.4 MMOL/L — LOW (ref 3.5–5.3)
RBC # BLD: 3.17 M/UL — LOW (ref 3.8–5.2)
RBC # FLD: 14.3 % — SIGNIFICANT CHANGE UP (ref 10.3–16.9)
SODIUM SERPL-SCNC: 139 MMOL/L — SIGNIFICANT CHANGE UP (ref 135–145)
WBC # BLD: 9.2 K/UL — SIGNIFICANT CHANGE UP (ref 3.8–10.5)
WBC # FLD AUTO: 9.2 K/UL — SIGNIFICANT CHANGE UP (ref 3.8–10.5)

## 2019-01-28 PROCEDURE — 87641 MR-STAPH DNA AMP PROBE: CPT

## 2019-01-28 PROCEDURE — 36415 COLL VENOUS BLD VENIPUNCTURE: CPT

## 2019-01-28 PROCEDURE — 97116 GAIT TRAINING THERAPY: CPT

## 2019-01-28 PROCEDURE — 80048 BASIC METABOLIC PNL TOTAL CA: CPT

## 2019-01-28 PROCEDURE — 81001 URINALYSIS AUTO W/SCOPE: CPT

## 2019-01-28 PROCEDURE — 85027 COMPLETE CBC AUTOMATED: CPT

## 2019-01-28 PROCEDURE — 97110 THERAPEUTIC EXERCISES: CPT

## 2019-01-28 PROCEDURE — 88300 SURGICAL PATH GROSS: CPT

## 2019-01-28 PROCEDURE — 97161 PT EVAL LOW COMPLEX 20 MIN: CPT

## 2019-01-28 PROCEDURE — 97530 THERAPEUTIC ACTIVITIES: CPT

## 2019-01-28 PROCEDURE — 73560 X-RAY EXAM OF KNEE 1 OR 2: CPT

## 2019-01-28 PROCEDURE — 71045 X-RAY EXAM CHEST 1 VIEW: CPT

## 2019-01-28 PROCEDURE — C1776: CPT

## 2019-01-28 PROCEDURE — 85730 THROMBOPLASTIN TIME PARTIAL: CPT

## 2019-01-28 PROCEDURE — C1713: CPT

## 2019-01-28 RX ORDER — POLYETHYLENE GLYCOL 3350 17 G/17G
17 POWDER, FOR SOLUTION ORAL
Qty: 0 | Refills: 0 | DISCHARGE
Start: 2019-01-28

## 2019-01-28 RX ORDER — ASPIRIN/CALCIUM CARB/MAGNESIUM 324 MG
1 TABLET ORAL
Qty: 60 | Refills: 0
Start: 2019-01-28 | End: 2019-02-26

## 2019-01-28 RX ORDER — DOCUSATE SODIUM 100 MG
1 CAPSULE ORAL
Qty: 0 | Refills: 0 | DISCHARGE
Start: 2019-01-28

## 2019-01-28 RX ORDER — LACTULOSE 10 G/15ML
20 SOLUTION ORAL ONCE
Qty: 0 | Refills: 0 | Status: COMPLETED | OUTPATIENT
Start: 2019-01-28 | End: 2019-01-28

## 2019-01-28 RX ORDER — CELECOXIB 200 MG/1
1 CAPSULE ORAL
Qty: 60 | Refills: 0
Start: 2019-01-28 | End: 2019-02-26

## 2019-01-28 RX ORDER — CELECOXIB 200 MG/1
200 CAPSULE ORAL
Qty: 0 | Refills: 0 | Status: DISCONTINUED | OUTPATIENT
Start: 2019-01-28 | End: 2019-01-28

## 2019-01-28 RX ORDER — SENNA PLUS 8.6 MG/1
2 TABLET ORAL
Qty: 0 | Refills: 0 | DISCHARGE
Start: 2019-01-28

## 2019-01-28 RX ADMIN — LOSARTAN POTASSIUM 12.5 MILLIGRAM(S): 100 TABLET, FILM COATED ORAL at 05:30

## 2019-01-28 RX ADMIN — Medication 325 MILLIGRAM(S): at 05:30

## 2019-01-28 RX ADMIN — Medication 325 MILLIGRAM(S): at 17:46

## 2019-01-28 RX ADMIN — OXYCODONE AND ACETAMINOPHEN 2 TABLET(S): 5; 325 TABLET ORAL at 00:45

## 2019-01-28 RX ADMIN — CELECOXIB 200 MILLIGRAM(S): 200 CAPSULE ORAL at 12:31

## 2019-01-28 RX ADMIN — Medication 100 MILLIGRAM(S): at 05:30

## 2019-01-28 RX ADMIN — OXYCODONE AND ACETAMINOPHEN 2 TABLET(S): 5; 325 TABLET ORAL at 12:31

## 2019-01-28 RX ADMIN — OXYCODONE AND ACETAMINOPHEN 2 TABLET(S): 5; 325 TABLET ORAL at 11:31

## 2019-01-28 RX ADMIN — AMLODIPINE BESYLATE 10 MILLIGRAM(S): 2.5 TABLET ORAL at 05:30

## 2019-01-28 RX ADMIN — OXYCODONE AND ACETAMINOPHEN 2 TABLET(S): 5; 325 TABLET ORAL at 19:17

## 2019-01-28 RX ADMIN — CELECOXIB 200 MILLIGRAM(S): 200 CAPSULE ORAL at 11:31

## 2019-01-28 RX ADMIN — PANTOPRAZOLE SODIUM 40 MILLIGRAM(S): 20 TABLET, DELAYED RELEASE ORAL at 07:32

## 2019-01-28 NOTE — PROGRESS NOTE ADULT - SUBJECTIVE AND OBJECTIVE BOX
ORTHO NOTE    [x ] Pt seen/examined.  [ ] Pt without any complaints/in NAD.    [x ] Pt complains of:      ROS: [ ] Fever  [ ] Chills  [ ] CP [ ] SOB [ ] Dysnea  [ ] Palpitations [ ] Cough [ ] N/V/C/D [ ] Paresthia [ ] Other     [x ] ROS  otherwise negative    .    PHYSICAL EXAM:    Vital Signs Last 24 Hrs  T(C): 37.1 (28 Jan 2019 04:52), Max: 37.3 (27 Jan 2019 16:19)  T(F): 98.7 (28 Jan 2019 04:52), Max: 99.2 (27 Jan 2019 16:19)  HR: 99 (28 Jan 2019 04:52) (89 - 116)  BP: 133/60 (28 Jan 2019 04:52) (95/58 - 133/60)  BP(mean): --  RR: 16 (28 Jan 2019 04:52) (16 - 16)  SpO2: 97% (28 Jan 2019 04:52) (95% - 99%)    I&O's Detail       CAPILLARY BLOOD GLUCOSE                      Neuro: AAOx3    Lungs: nonlabored, Spo2 wnl on RA    CV:    ABD: soft, nontender    Ext:  left knee aquacel dressing stained but contained inside dressing  Pulses intact LLE  Sensation intact LLE  Motor: EHL/FHL/TA/GS 5/5 LLE    LABS                        8.7    9.2   )-----------( 313      ( 28 Jan 2019 06:13 )             27.2                                01-28    139  |  96  |  28<H>  ----------------------------<  177<H>  3.4<L>   |  28  |  1.06    Ca    8.4      28 Jan 2019 06:13        [ ] Other Labs  [ ] None ordered            Please check or [place] when present:  •  Heart Failure:    [ ] Acute        [ ]  Acute on Chronic        [ ] Chronic         [ ] Diastolic     [ ]  Combined    •  ANAMARIA:     [ ] ATN        [ ]  Renal medullary necrosis       [ ]  Renal cortical necrosis                  [ ] Other pathological Lesion:  •  CKD:  [ ] Stage I   [ ] Stage II  [ ] Stage III    [ ]Stage IV   [ ]  CKD V   [ ]  Other/Unspecified:    •  Abdominal Nutritional Status:   [ ] Malnutrition-See Nutrition note    [ ] Cachexia   [ ]  Other        [ ] Supplement ordered:            [ ] Morbid Obesity: BMI >=40         ASSESSMENT/PLAN:      STATUS POST: L TKA  h/h stable  pain control- added non-narcotic celebrex 200mg bid for antiinflammatory  bowel regimen  aquacel dressing changed    CONTINUE:          [ ] PT- wbat    [ ] DVT PPX- scd, ASA    [ ] Pain Mgt- po meds    [ ] Dispo plan- home when clears PT

## 2019-01-28 NOTE — PROGRESS NOTE ADULT - SUBJECTIVE AND OBJECTIVE BOX
Ortho Note    Progressing with PT, home when clears  Pt comfortable without complaints, pain controlled  Denies CP, SOB, N/V, numbness/tingling     Vital Signs Last 24 Hrs  T(C): 37.1 (28 Jan 2019 04:52), Max: 37.3 (27 Jan 2019 16:19)  T(F): 98.7 (28 Jan 2019 04:52), Max: 99.2 (27 Jan 2019 16:19)  HR: 99 (28 Jan 2019 04:52) (89 - 116)  BP: 133/60 (28 Jan 2019 04:52) (95/58 - 133/60)  BP(mean): --  RR: 16 (28 Jan 2019 04:52) (16 - 16)  SpO2: 97% (28 Jan 2019 04:52) (95% - 99%)      General: Pt Alert and oriented, NAD  DSG C/D/I left knee, aquacel  Pulses intact LLE  Sensation intact LLE  Motor: EHL/FHL/TA/GS 5/5 LLE      A/P: 78yFemale s/p Left TKR  - Stable  - Pain Control  - DVT ppx: asa  - PT, WBS: wbat  - dispo: Home pending PT progression    Ortho Pager [phone]

## 2019-01-28 NOTE — PROGRESS NOTE ADULT - REASON FOR ADMISSION
left knee pain

## 2019-01-31 DIAGNOSIS — I10 ESSENTIAL (PRIMARY) HYPERTENSION: ICD-10-CM

## 2019-01-31 DIAGNOSIS — M17.12 UNILATERAL PRIMARY OSTEOARTHRITIS, LEFT KNEE: ICD-10-CM

## 2019-01-31 DIAGNOSIS — M25.562 PAIN IN LEFT KNEE: ICD-10-CM

## 2019-10-10 PROBLEM — I10 ESSENTIAL (PRIMARY) HYPERTENSION: Chronic | Status: ACTIVE | Noted: 2019-01-15

## 2019-10-10 PROBLEM — M19.90 UNSPECIFIED OSTEOARTHRITIS, UNSPECIFIED SITE: Chronic | Status: ACTIVE | Noted: 2019-01-15

## 2019-10-29 VITALS
DIASTOLIC BLOOD PRESSURE: 83 MMHG | RESPIRATION RATE: 16 BRPM | TEMPERATURE: 97 F | OXYGEN SATURATION: 99 % | SYSTOLIC BLOOD PRESSURE: 177 MMHG | WEIGHT: 169.32 LBS | HEIGHT: 60 IN | HEART RATE: 90 BPM

## 2019-10-29 RX ORDER — AMLODIPINE BESYLATE 2.5 MG/1
1 TABLET ORAL
Qty: 0 | Refills: 0 | DISCHARGE

## 2019-10-29 RX ORDER — LOSARTAN/HYDROCHLOROTHIAZIDE 100MG-25MG
1 TABLET ORAL
Qty: 0 | Refills: 0 | DISCHARGE

## 2019-10-29 NOTE — ASU PREOP CHECKLIST - AS BP NONINV SITE
left upper arm Iron deficiency anemia due to chronic blood loss Lupus erythematosus, unspecified form Hypothyroidism, unspecified type

## 2019-10-29 NOTE — ASU PATIENT PROFILE, ADULT - TEACHING/LEARNING LEARNING PREFERENCES
written material/verbal instruction skill demonstration/individual instruction/verbal instruction/written material

## 2019-10-30 ENCOUNTER — INPATIENT (INPATIENT)
Facility: HOSPITAL | Age: 79
LOS: 1 days | Discharge: HOME HEALTH | DRG: 470 | End: 2019-11-01
Payer: MEDICARE

## 2019-10-30 DIAGNOSIS — I10 ESSENTIAL (PRIMARY) HYPERTENSION: ICD-10-CM

## 2019-10-30 DIAGNOSIS — M19.90 UNSPECIFIED OSTEOARTHRITIS, UNSPECIFIED SITE: ICD-10-CM

## 2019-10-30 DIAGNOSIS — Z96.652 PRESENCE OF LEFT ARTIFICIAL KNEE JOINT: Chronic | ICD-10-CM

## 2019-10-30 LAB
BASOPHILS # BLD AUTO: 0.06 K/UL — SIGNIFICANT CHANGE UP (ref 0–0.2)
BASOPHILS NFR BLD AUTO: 0.5 % — SIGNIFICANT CHANGE UP (ref 0–2)
EOSINOPHIL # BLD AUTO: 0.08 K/UL — SIGNIFICANT CHANGE UP (ref 0–0.5)
EOSINOPHIL NFR BLD AUTO: 0.7 % — SIGNIFICANT CHANGE UP (ref 0–6)
HCT VFR BLD CALC: 37.5 % — SIGNIFICANT CHANGE UP (ref 34.5–45)
HGB BLD-MCNC: 11.7 G/DL — SIGNIFICANT CHANGE UP (ref 11.5–15.5)
IMM GRANULOCYTES NFR BLD AUTO: 0.4 % — SIGNIFICANT CHANGE UP (ref 0–1.5)
LYMPHOCYTES # BLD AUTO: 25.8 % — SIGNIFICANT CHANGE UP (ref 13–44)
LYMPHOCYTES # BLD AUTO: 3.07 K/UL — SIGNIFICANT CHANGE UP (ref 1–3.3)
MCHC RBC-ENTMCNC: 26.4 PG — LOW (ref 27–34)
MCHC RBC-ENTMCNC: 31.2 GM/DL — LOW (ref 32–36)
MCV RBC AUTO: 84.7 FL — SIGNIFICANT CHANGE UP (ref 80–100)
MONOCYTES # BLD AUTO: 0.35 K/UL — SIGNIFICANT CHANGE UP (ref 0–0.9)
MONOCYTES NFR BLD AUTO: 2.9 % — SIGNIFICANT CHANGE UP (ref 2–14)
MRSA PCR RESULT.: NEGATIVE — SIGNIFICANT CHANGE UP
NEUTROPHILS # BLD AUTO: 8.29 K/UL — HIGH (ref 1.8–7.4)
NEUTROPHILS NFR BLD AUTO: 69.7 % — SIGNIFICANT CHANGE UP (ref 43–77)
NRBC # BLD: 0 /100 WBCS — SIGNIFICANT CHANGE UP (ref 0–0)
PLATELET # BLD AUTO: 300 K/UL — SIGNIFICANT CHANGE UP (ref 150–400)
RBC # BLD: 4.43 M/UL — SIGNIFICANT CHANGE UP (ref 3.8–5.2)
RBC # FLD: 15.1 % — HIGH (ref 10.3–14.5)
S AUREUS DNA NOSE QL NAA+PROBE: NEGATIVE — SIGNIFICANT CHANGE UP
WBC # BLD: 11.9 K/UL — HIGH (ref 3.8–10.5)
WBC # FLD AUTO: 11.9 K/UL — HIGH (ref 3.8–10.5)

## 2019-10-30 PROCEDURE — 73560 X-RAY EXAM OF KNEE 1 OR 2: CPT | Mod: 26,RT

## 2019-10-30 RX ORDER — CEFAZOLIN SODIUM 1 G
2000 VIAL (EA) INJECTION EVERY 8 HOURS
Refills: 0 | Status: DISCONTINUED | OUTPATIENT
Start: 2019-10-30 | End: 2019-10-30

## 2019-10-30 RX ORDER — CHLORHEXIDINE GLUCONATE 213 G/1000ML
1 SOLUTION TOPICAL ONCE
Refills: 0 | Status: DISCONTINUED | OUTPATIENT
Start: 2019-10-30 | End: 2019-11-01

## 2019-10-30 RX ORDER — SENNA PLUS 8.6 MG/1
2 TABLET ORAL AT BEDTIME
Refills: 0 | Status: DISCONTINUED | OUTPATIENT
Start: 2019-10-30 | End: 2019-11-01

## 2019-10-30 RX ORDER — MORPHINE SULFATE 50 MG/1
4 CAPSULE, EXTENDED RELEASE ORAL EVERY 4 HOURS
Refills: 0 | Status: DISCONTINUED | OUTPATIENT
Start: 2019-10-30 | End: 2019-11-01

## 2019-10-30 RX ORDER — TRAMADOL HYDROCHLORIDE 50 MG/1
25 TABLET ORAL EVERY 4 HOURS
Refills: 0 | Status: DISCONTINUED | OUTPATIENT
Start: 2019-10-30 | End: 2019-11-01

## 2019-10-30 RX ORDER — METOPROLOL TARTRATE 50 MG
2 TABLET ORAL ONCE
Refills: 0 | Status: COMPLETED | OUTPATIENT
Start: 2019-10-30 | End: 2019-10-30

## 2019-10-30 RX ORDER — MORPHINE SULFATE 50 MG/1
4 CAPSULE, EXTENDED RELEASE ORAL ONCE
Refills: 0 | Status: DISCONTINUED | OUTPATIENT
Start: 2019-10-30 | End: 2019-10-30

## 2019-10-30 RX ORDER — CELECOXIB 200 MG/1
200 CAPSULE ORAL
Refills: 0 | Status: DISCONTINUED | OUTPATIENT
Start: 2019-10-31 | End: 2019-11-01

## 2019-10-30 RX ORDER — ASPIRIN/CALCIUM CARB/MAGNESIUM 324 MG
325 TABLET ORAL
Refills: 0 | Status: DISCONTINUED | OUTPATIENT
Start: 2019-10-30 | End: 2019-11-01

## 2019-10-30 RX ORDER — SODIUM CHLORIDE 9 MG/ML
1000 INJECTION, SOLUTION INTRAVENOUS
Refills: 0 | Status: DISCONTINUED | OUTPATIENT
Start: 2019-10-30 | End: 2019-11-01

## 2019-10-30 RX ORDER — ACETAMINOPHEN 500 MG
975 TABLET ORAL EVERY 8 HOURS
Refills: 0 | Status: DISCONTINUED | OUTPATIENT
Start: 2019-10-30 | End: 2019-11-01

## 2019-10-30 RX ORDER — POLYETHYLENE GLYCOL 3350 17 G/17G
17 POWDER, FOR SOLUTION ORAL DAILY
Refills: 0 | Status: DISCONTINUED | OUTPATIENT
Start: 2019-10-30 | End: 2019-11-01

## 2019-10-30 RX ORDER — TRAMADOL HYDROCHLORIDE 50 MG/1
50 TABLET ORAL EVERY 4 HOURS
Refills: 0 | Status: DISCONTINUED | OUTPATIENT
Start: 2019-10-30 | End: 2019-11-01

## 2019-10-30 RX ORDER — ONDANSETRON 8 MG/1
4 TABLET, FILM COATED ORAL EVERY 6 HOURS
Refills: 0 | Status: DISCONTINUED | OUTPATIENT
Start: 2019-10-30 | End: 2019-11-01

## 2019-10-30 RX ORDER — OXYCODONE HYDROCHLORIDE 5 MG/1
10 TABLET ORAL EVERY 4 HOURS
Refills: 0 | Status: DISCONTINUED | OUTPATIENT
Start: 2019-10-30 | End: 2019-10-30

## 2019-10-30 RX ORDER — CEFAZOLIN SODIUM 1 G
2000 VIAL (EA) INJECTION EVERY 8 HOURS
Refills: 0 | Status: COMPLETED | OUTPATIENT
Start: 2019-10-30 | End: 2019-10-31

## 2019-10-30 RX ORDER — MAGNESIUM HYDROXIDE 400 MG/1
30 TABLET, CHEWABLE ORAL DAILY
Refills: 0 | Status: DISCONTINUED | OUTPATIENT
Start: 2019-10-30 | End: 2019-11-01

## 2019-10-30 RX ORDER — HYDROMORPHONE HYDROCHLORIDE 2 MG/ML
0.5 INJECTION INTRAMUSCULAR; INTRAVENOUS; SUBCUTANEOUS ONCE
Refills: 0 | Status: DISCONTINUED | OUTPATIENT
Start: 2019-10-30 | End: 2019-10-30

## 2019-10-30 RX ORDER — KETOROLAC TROMETHAMINE 30 MG/ML
15 SYRINGE (ML) INJECTION EVERY 6 HOURS
Refills: 0 | Status: DISCONTINUED | OUTPATIENT
Start: 2019-10-30 | End: 2019-10-31

## 2019-10-30 RX ORDER — MORPHINE SULFATE 50 MG/1
2 CAPSULE, EXTENDED RELEASE ORAL
Refills: 0 | Status: DISCONTINUED | OUTPATIENT
Start: 2019-10-30 | End: 2019-10-30

## 2019-10-30 RX ORDER — POVIDONE-IODINE 5 %
1 AEROSOL (ML) TOPICAL ONCE
Refills: 0 | Status: DISCONTINUED | OUTPATIENT
Start: 2019-10-30 | End: 2019-11-01

## 2019-10-30 RX ADMIN — HYDROMORPHONE HYDROCHLORIDE 0.5 MILLIGRAM(S): 2 INJECTION INTRAMUSCULAR; INTRAVENOUS; SUBCUTANEOUS at 14:25

## 2019-10-30 RX ADMIN — Medication 2000 MILLIGRAM(S): at 18:00

## 2019-10-30 RX ADMIN — MORPHINE SULFATE 2 MILLIGRAM(S): 50 CAPSULE, EXTENDED RELEASE ORAL at 12:50

## 2019-10-30 RX ADMIN — Medication 15 MILLIGRAM(S): at 23:15

## 2019-10-30 RX ADMIN — Medication 15 MILLIGRAM(S): at 18:00

## 2019-10-30 RX ADMIN — Medication 15 MILLIGRAM(S): at 18:15

## 2019-10-30 RX ADMIN — Medication 975 MILLIGRAM(S): at 23:15

## 2019-10-30 RX ADMIN — MORPHINE SULFATE 2 MILLIGRAM(S): 50 CAPSULE, EXTENDED RELEASE ORAL at 12:35

## 2019-10-30 RX ADMIN — MORPHINE SULFATE 2 MILLIGRAM(S): 50 CAPSULE, EXTENDED RELEASE ORAL at 12:55

## 2019-10-30 RX ADMIN — MORPHINE SULFATE 2 MILLIGRAM(S): 50 CAPSULE, EXTENDED RELEASE ORAL at 13:10

## 2019-10-30 RX ADMIN — Medication 975 MILLIGRAM(S): at 18:00

## 2019-10-30 RX ADMIN — Medication 15 MILLIGRAM(S): at 23:45

## 2019-10-30 RX ADMIN — Medication 975 MILLIGRAM(S): at 19:00

## 2019-10-30 RX ADMIN — Medication 975 MILLIGRAM(S): at 23:45

## 2019-10-30 RX ADMIN — SODIUM CHLORIDE 80 MILLILITER(S): 9 INJECTION, SOLUTION INTRAVENOUS at 13:04

## 2019-10-30 RX ADMIN — Medication 325 MILLIGRAM(S): at 17:11

## 2019-10-30 RX ADMIN — HYDROMORPHONE HYDROCHLORIDE 0.5 MILLIGRAM(S): 2 INJECTION INTRAMUSCULAR; INTRAVENOUS; SUBCUTANEOUS at 14:10

## 2019-10-30 RX ADMIN — Medication 2 MILLIGRAM(S): at 14:10

## 2019-10-30 NOTE — H&P ADULT - PROBLEM SELECTOR PLAN 1
Admit to Orthopaedic Service.  Presents today for elective RIGHT TKR.   Pt medically stable and cleared for procedure today by  Admit to Orthopaedic Service.  Presents today for elective RIGHT TKR.   Pt medically stable and cleared for procedure today by Dr. Salgado.

## 2019-10-30 NOTE — H&P ADULT - NSHPPHYSICALEXAM_GEN_ALL_CORE
General: NAD  PE:   SLT INTACT, DP/PT 2+, EHL/TA/GS   Decreased ROM secondary to pain. Rest of PE per medical clearance. General: NAD  PE: left le + insicion s/p left tkr intact. Right le skin intact with mild swelling. + ttp at medial and lateral joint lines. + crepitus with ROM. SLT INTACT, DP/PT 2+, EHL/TA/GS 5/5.  Decreased ROM secondary to pain. Rest of PE per medical clearance.

## 2019-10-30 NOTE — H&P ADULT - HISTORY OF PRESENT ILLNESS
77yo f c/o right knee pain x   Presents today for elective RIGHT TKR. 79yo f c/o right knee pain x 1 year. Pt. denies any accident or injury but states she has arthritis. Pt. reports she recently had left tkr January 2019. Pt. has been using cane assistance since last surgery. Pt. has increased pain with walking. Pt. is able to ambulate around the house and 1-2 blocks outside. Pt. denies any radiation of knee pain, numbness or tingling in right leg. Presents today for elective RIGHT TKR.

## 2019-10-30 NOTE — H&P ADULT - NSHPLABSRESULTS_GEN_ALL_CORE
preop cbc/bmp/coags/ua wnl per medical clearance   Preop EKG wnl per clearance   Preop chest x-ray wnl per clearance  nares dos

## 2019-10-30 NOTE — PHYSICAL THERAPY INITIAL EVALUATION ADULT - CRITERIA FOR SKILLED THERAPEUTIC INTERVENTIONS
anticipated discharge recommendation/impairments found/therapy frequency/anticipated equipment needs at discharge/rehab potential

## 2019-10-30 NOTE — PROGRESS NOTE ADULT - SUBJECTIVE AND OBJECTIVE BOX
Orthopaedic Surgery Post Operative Check    Procedure: right total knee replacement   Surgeon: Dr. Nye     Pt comfortable without complaints, pain controlled  Denies CP, SOB, N/V, numbness/tingling    Vital Signs Last 24 Hrs  AVSS    General: Pt Alert and oriented, NAD  DSG C/D/I right knee cryocuff  Pulses: DP pulse 2+ RLE   Sensation: intact and equal to bilateral lower extremities   Motor: EHL/FHL/TA/GS firing bilateral lower extremities                           11.7   11.90 )-----------( 300      ( 30 Oct 2019 12:52 )             37.5           Post-op X-Ray: prosthesis in place     A/P: 78yFemale POD#0 s/p   - Stable  - Pain Control  - DVT ppx: ASA  - Post op abx: ancef  - PT, WBS:  WBAT  - f/u AM labs     Ortho Pager [phone] Orthopaedic Surgery Post Operative Check    Procedure: right total knee replacement   Surgeon: Dr. Nye     Pt comfortable without complaints, pain controlled  Denies CP, SOB, N/V, numbness/tingling    Vital Signs Last 24 Hrs  AVSS    General: Pt Alert and oriented, NAD  DSG C/D/I right knee cryocuff  Pulses: DP pulse 2+ RLE   Sensation: intact and equal to bilateral lower extremities   Motor: EHL/FHL/TA/GS firing bilateral lower extremities                           11.7   11.90 )-----------( 300      ( 30 Oct 2019 12:52 )             37.5           Post-op X-Ray: prosthesis in place     A/P: 78yFemale POD#0 s/p right total knee replacement   - Stable  - Pain Control  - DVT ppx: ASA  - Post op abx: ancef  - PT, WBS:  WBAT  - f/u AM labs     Ortho Pager [phone]

## 2019-10-30 NOTE — PHYSICAL THERAPY INITIAL EVALUATION ADULT - ADDITIONAL COMMENTS
Patient lives in private house with 3 steps to enter with 1 rail. Lives with family. Ambulated with cane PTA. Owns RW. denies h/o falls in last 6 months

## 2019-10-30 NOTE — PHYSICAL THERAPY INITIAL EVALUATION ADULT - GENERAL OBSERVATIONS, REHAB EVAL
As per Arielle FELIZ patient cleared for PT/OOB. Received supine + cryocuff, IV, SCDs, oxygen 2LNC, in no apparent distress

## 2019-10-30 NOTE — PHYSICAL THERAPY INITIAL EVALUATION ADULT - ACTIVE RANGE OF MOTION EXAMINATION, REHAB EVAL
felicita. upper extremity Active ROM was WNL (within normal limits)/Left LE Active ROM was WFL (within functional limits)/right knee 10-60

## 2019-10-31 LAB
ANION GAP SERPL CALC-SCNC: 12 MMOL/L — SIGNIFICANT CHANGE UP (ref 5–17)
BUN SERPL-MCNC: 18 MG/DL — SIGNIFICANT CHANGE UP (ref 7–23)
CALCIUM SERPL-MCNC: 8.8 MG/DL — SIGNIFICANT CHANGE UP (ref 8.4–10.5)
CHLORIDE SERPL-SCNC: 104 MMOL/L — SIGNIFICANT CHANGE UP (ref 96–108)
CO2 SERPL-SCNC: 24 MMOL/L — SIGNIFICANT CHANGE UP (ref 22–31)
CREAT SERPL-MCNC: 0.75 MG/DL — SIGNIFICANT CHANGE UP (ref 0.5–1.3)
GLUCOSE SERPL-MCNC: 165 MG/DL — HIGH (ref 70–99)
HCT VFR BLD CALC: 28.9 % — LOW (ref 34.5–45)
HGB BLD-MCNC: 9.1 G/DL — LOW (ref 11.5–15.5)
MCHC RBC-ENTMCNC: 26.4 PG — LOW (ref 27–34)
MCHC RBC-ENTMCNC: 31.5 GM/DL — LOW (ref 32–36)
MCV RBC AUTO: 83.8 FL — SIGNIFICANT CHANGE UP (ref 80–100)
NRBC # BLD: 0 /100 WBCS — SIGNIFICANT CHANGE UP (ref 0–0)
PLATELET # BLD AUTO: 256 K/UL — SIGNIFICANT CHANGE UP (ref 150–400)
POTASSIUM SERPL-MCNC: 3.8 MMOL/L — SIGNIFICANT CHANGE UP (ref 3.5–5.3)
POTASSIUM SERPL-SCNC: 3.8 MMOL/L — SIGNIFICANT CHANGE UP (ref 3.5–5.3)
RBC # BLD: 3.45 M/UL — LOW (ref 3.8–5.2)
RBC # FLD: 15.3 % — HIGH (ref 10.3–14.5)
SODIUM SERPL-SCNC: 140 MMOL/L — SIGNIFICANT CHANGE UP (ref 135–145)
WBC # BLD: 14.58 K/UL — HIGH (ref 3.8–10.5)
WBC # FLD AUTO: 14.58 K/UL — HIGH (ref 3.8–10.5)

## 2019-10-31 RX ADMIN — Medication 15 MILLIGRAM(S): at 22:31

## 2019-10-31 RX ADMIN — Medication 15 MILLIGRAM(S): at 18:09

## 2019-10-31 RX ADMIN — Medication 975 MILLIGRAM(S): at 15:06

## 2019-10-31 RX ADMIN — POLYETHYLENE GLYCOL 3350 17 GRAM(S): 17 POWDER, FOR SOLUTION ORAL at 12:40

## 2019-10-31 RX ADMIN — Medication 15 MILLIGRAM(S): at 06:15

## 2019-10-31 RX ADMIN — CELECOXIB 200 MILLIGRAM(S): 200 CAPSULE ORAL at 18:09

## 2019-10-31 RX ADMIN — Medication 15 MILLIGRAM(S): at 23:00

## 2019-10-31 RX ADMIN — Medication 975 MILLIGRAM(S): at 05:31

## 2019-10-31 RX ADMIN — Medication 325 MILLIGRAM(S): at 18:09

## 2019-10-31 RX ADMIN — Medication 975 MILLIGRAM(S): at 23:00

## 2019-10-31 RX ADMIN — Medication 15 MILLIGRAM(S): at 12:40

## 2019-10-31 RX ADMIN — CELECOXIB 200 MILLIGRAM(S): 200 CAPSULE ORAL at 19:10

## 2019-10-31 RX ADMIN — Medication 975 MILLIGRAM(S): at 14:06

## 2019-10-31 RX ADMIN — CELECOXIB 200 MILLIGRAM(S): 200 CAPSULE ORAL at 05:31

## 2019-10-31 RX ADMIN — TRAMADOL HYDROCHLORIDE 50 MILLIGRAM(S): 50 TABLET ORAL at 09:05

## 2019-10-31 RX ADMIN — Medication 15 MILLIGRAM(S): at 05:30

## 2019-10-31 RX ADMIN — Medication 975 MILLIGRAM(S): at 06:15

## 2019-10-31 RX ADMIN — TRAMADOL HYDROCHLORIDE 50 MILLIGRAM(S): 50 TABLET ORAL at 10:00

## 2019-10-31 RX ADMIN — Medication 2000 MILLIGRAM(S): at 02:28

## 2019-10-31 RX ADMIN — Medication 15 MILLIGRAM(S): at 18:25

## 2019-10-31 RX ADMIN — Medication 975 MILLIGRAM(S): at 22:31

## 2019-10-31 RX ADMIN — Medication 15 MILLIGRAM(S): at 12:55

## 2019-10-31 RX ADMIN — CELECOXIB 200 MILLIGRAM(S): 200 CAPSULE ORAL at 06:15

## 2019-10-31 RX ADMIN — Medication 325 MILLIGRAM(S): at 05:30

## 2019-10-31 NOTE — PROGRESS NOTE ADULT - SUBJECTIVE AND OBJECTIVE BOX
SUBJECTIVE: Patient seen and examined. Pain controlled.    OBJECTIVE:  NAD  Vital Signs Last 24 Hrs  T(C): 36.8 (31 Oct 2019 05:08), Max: 36.8 (30 Oct 2019 20:28)  T(F): 98.3 (31 Oct 2019 05:08), Max: 98.3 (30 Oct 2019 20:28)  HR: 90 (31 Oct 2019 05:08) (74 - 100)  BP: 134/60 (31 Oct 2019 05:08) (98/58 - 205/96)  BP(mean): 101 (30 Oct 2019 14:55) (95 - 138)  RR: 16 (31 Oct 2019 05:08) (12 - 20)  SpO2: 96% (31 Oct 2019 05:08) (95% - 100%)    Affected extremity:          Dressing: clean/dry/intact            Sensation: SILT         Motor exam: 5/5 TA/GS/EHL         warm well perfused; capillary refill <3 seconds                         11.7   11.90 )-----------( 300      ( 30 Oct 2019 12:52 )             37.5           I&O's Detail    30 Oct 2019 07:01  -  31 Oct 2019 06:30  --------------------------------------------------------  IN:    lactated ringers.: 1640 mL    Oral Fluid: 1240 mL  Total IN: 2880 mL    OUT:    Voided: 1800 mL  Total OUT: 1800 mL    Total NET: 1080 mL        A/P :  Pt is a 77yo Female s/p  R TKA  -    Pain control  -    DVT ppx: SCD/ASA     -    Weight bearing status: WBAT   -    Physical Therapy  -    Dispo: Home    Jay Potts MD  Senior Orthopaedic Resident  Orthopaedic Surgery

## 2019-10-31 NOTE — DISCHARGE NOTE PROVIDER - NSDCFUADDINST_GEN_ALL_CORE_FT
Weight bear as tolerated with assistive device.  No strenuous activity, heavy lifting, driving or returning to work until cleared by MD.  You may shower - dressing is water-resistant, no soaking in bathtubs.  Remove dressing after post op day 5-7, then leave incision open to air. Keep incision clean and dry.  Try to have regular bowel movements, take stool softener or laxative if necessary.  May take Pepcid or Zantac for upset stomach.  May take Aleve or Naproxen instead of Meloxicam/Celebrex.  Swelling may travel all the way down leg to foot, this is normal and will subside in a few weeks.  Call to schedule an appt with Dr. Nye for follow up, if you have staples or sutures they will be removed in office.  Contact your doctor if you experience: fever greater than 101.5, chills, chest pain, difficulty breathing, redness or excessive drainage around the incision, other concerns.  Follow up with your primary care provider.

## 2019-10-31 NOTE — PROGRESS NOTE ADULT - SUBJECTIVE AND OBJECTIVE BOX
POST OPERATIVE DAY #: 1  STATUS POST: Right TKR                      SUBJECTIVE: Patient seen and examined. States to doing well. Patient denies any CP, SOB, fever, chills, numbness/tingling.     Pain:  well controlled      OBJECTIVE:     Vital Signs Last 24 Hrs  T(C): 36.3 (31 Oct 2019 08:59), Max: 36.8 (30 Oct 2019 20:28)  T(F): 97.3 (31 Oct 2019 08:59), Max: 98.3 (30 Oct 2019 20:28)  HR: 99 (31 Oct 2019 08:59) (74 - 100)  BP: 135/70 (31 Oct 2019 08:59) (98/58 - 205/96)  BP(mean): 101 (30 Oct 2019 14:55) (95 - 138)  RR: 15 (31 Oct 2019 08:59) (12 - 20)  SpO2: 96% (31 Oct 2019 08:59) (95% - 100%)    Affected extremity:          Dressing: clean/dry/intact          Sensation: intact to light touch          Motor exam: 5/5 to EHL/TA/GS         warm, well-perfused; capillary refill < 3 seconds              I&O's Detail    30 Oct 2019 07:01  -  31 Oct 2019 07:00  --------------------------------------------------------  IN:    lactated ringers.: 1640 mL    Oral Fluid: 1240 mL  Total IN: 2880 mL    OUT:    Voided: 1800 mL  Total OUT: 1800 mL    Total NET: 1080 mL      31 Oct 2019 07:01  -  31 Oct 2019 11:29  --------------------------------------------------------  IN:    Oral Fluid: 240 mL  Total IN: 240 mL    OUT:  Total OUT: 0 mL    Total NET: 240 mL          LABS:                        9.1    14.58 )-----------( 256      ( 31 Oct 2019 07:52 )             28.9     10-31    140  |  104  |  18  ----------------------------<  165<H>  3.8   |  24  |  0.75    Ca    8.8      31 Oct 2019 07:52            MEDICATIONS:    acetaminophen   Tablet .. 975 milliGRAM(s) Oral every 8 hours  celecoxib 200 milliGRAM(s) Oral two times a day  ketorolac   Injectable 15 milliGRAM(s) IV Push every 6 hours  morphine  - Injectable 4 milliGRAM(s) IV Push every 4 hours PRN  ondansetron Injectable 4 milliGRAM(s) IV Push every 6 hours PRN  traMADol 25 milliGRAM(s) Oral every 4 hours PRN  traMADol 50 milliGRAM(s) Oral every 4 hours PRN    aspirin enteric coated 325 milliGRAM(s) Oral two times a day      RADIOLOGY & ADDITIONAL STUDIES:    ASSESSMENT AND PLAN: s/p Right TKR    1. Analgesic pain control  2. DVT prophylaxis: ASA      SCDs        3. Continue PT  4. Weight Bearing Status:  Weight bearing as tolerated      5. Disposition: Home when medically cleared and PT cleared

## 2019-10-31 NOTE — DISCHARGE NOTE PROVIDER - NSDCCPCAREPLAN_GEN_ALL_CORE_FT
PRINCIPAL DISCHARGE DIAGNOSIS  Diagnosis: Osteoarthritis  Assessment and Plan of Treatment: Osteoarthritis

## 2019-10-31 NOTE — DISCHARGE NOTE PROVIDER - NSDCMRMEDTOKEN_GEN_ALL_CORE_FT
acetaminophen 325 mg oral tablet: 3 tab(s) orally every 8 hours as needed for mild pain   Aspirin Enteric Coated 325 mg oral delayed release tablet: 1 tab(s) orally 2 times a day  CeleBREX 200 mg oral capsule: 1 cap(s) orally 2 times a day  polyethylene glycol 3350 oral powder for reconstitution: 17 gram(s) orally once a day  senna oral tablet: 2 tab(s) orally once a day (at bedtime), As needed, Constipation  Ultram 50 mg oral tablet: 0.5-1 tab(s) orally every 4-6 hours, As needed for mdoerate to severe pain  MDD:6

## 2019-10-31 NOTE — DISCHARGE NOTE NURSING/CASE MANAGEMENT/SOCIAL WORK - PATIENT PORTAL LINK FT
You can access the FollowMyHealth Patient Portal offered by [facility] by registering at the following website: [email or web address]. By joining "[employer]’s FollowMyHealth portal, you will also be able to view your health information using other applications (apps) compatible with our system.

## 2019-10-31 NOTE — DISCHARGE NOTE PROVIDER - CARE PROVIDER_API CALL
Hansel Nye)  Orthopaedic Surgery  [address] [place] [address]  Phone: [phone]  Fax: [phone]  Follow Up Time:

## 2019-10-31 NOTE — DISCHARGE NOTE PROVIDER - HOSPITAL COURSE
Admitted to orthopedic service Dr. Nye     Surgery on 10/30/19    Rufina-op Antibiotics    Pain control    DVT prophylaxis    OOB/Physical Therapy

## 2019-11-01 VITALS
HEART RATE: 96 BPM | RESPIRATION RATE: 17 BRPM | SYSTOLIC BLOOD PRESSURE: 145 MMHG | DIASTOLIC BLOOD PRESSURE: 69 MMHG | TEMPERATURE: 98 F | OXYGEN SATURATION: 97 %

## 2019-11-01 LAB
ANION GAP SERPL CALC-SCNC: 11 MMOL/L — SIGNIFICANT CHANGE UP (ref 5–17)
BUN SERPL-MCNC: 22 MG/DL — SIGNIFICANT CHANGE UP (ref 7–23)
CALCIUM SERPL-MCNC: 8.4 MG/DL — SIGNIFICANT CHANGE UP (ref 8.4–10.5)
CHLORIDE SERPL-SCNC: 103 MMOL/L — SIGNIFICANT CHANGE UP (ref 96–108)
CO2 SERPL-SCNC: 29 MMOL/L — SIGNIFICANT CHANGE UP (ref 22–31)
CREAT SERPL-MCNC: 0.76 MG/DL — SIGNIFICANT CHANGE UP (ref 0.5–1.3)
GLUCOSE SERPL-MCNC: 106 MG/DL — HIGH (ref 70–99)
HCT VFR BLD CALC: 27.8 % — LOW (ref 34.5–45)
HGB BLD-MCNC: 8.7 G/DL — LOW (ref 11.5–15.5)
MCHC RBC-ENTMCNC: 26.8 PG — LOW (ref 27–34)
MCHC RBC-ENTMCNC: 31.3 GM/DL — LOW (ref 32–36)
MCV RBC AUTO: 85.5 FL — SIGNIFICANT CHANGE UP (ref 80–100)
NRBC # BLD: 0 /100 WBCS — SIGNIFICANT CHANGE UP (ref 0–0)
PLATELET # BLD AUTO: 242 K/UL — SIGNIFICANT CHANGE UP (ref 150–400)
POTASSIUM SERPL-MCNC: 3.3 MMOL/L — LOW (ref 3.5–5.3)
POTASSIUM SERPL-SCNC: 3.3 MMOL/L — LOW (ref 3.5–5.3)
RBC # BLD: 3.25 M/UL — LOW (ref 3.8–5.2)
RBC # FLD: 15.5 % — HIGH (ref 10.3–14.5)
SODIUM SERPL-SCNC: 143 MMOL/L — SIGNIFICANT CHANGE UP (ref 135–145)
WBC # BLD: 10.48 K/UL — SIGNIFICANT CHANGE UP (ref 3.8–10.5)
WBC # FLD AUTO: 10.48 K/UL — SIGNIFICANT CHANGE UP (ref 3.8–10.5)

## 2019-11-01 RX ORDER — ASPIRIN/CALCIUM CARB/MAGNESIUM 324 MG
1 TABLET ORAL
Qty: 60 | Refills: 0
Start: 2019-11-01 | End: 2019-11-30

## 2019-11-01 RX ORDER — POTASSIUM CHLORIDE 20 MEQ
40 PACKET (EA) ORAL ONCE
Refills: 0 | Status: COMPLETED | OUTPATIENT
Start: 2019-11-01 | End: 2019-11-01

## 2019-11-01 RX ORDER — SENNA PLUS 8.6 MG/1
2 TABLET ORAL
Qty: 0 | Refills: 0 | DISCHARGE
Start: 2019-11-01

## 2019-11-01 RX ORDER — TRAMADOL HYDROCHLORIDE 50 MG/1
0.5 TABLET ORAL
Qty: 21 | Refills: 0
Start: 2019-11-01 | End: 2019-11-07

## 2019-11-01 RX ORDER — POLYETHYLENE GLYCOL 3350 17 G/17G
17 POWDER, FOR SOLUTION ORAL
Qty: 0 | Refills: 0 | DISCHARGE
Start: 2019-11-01

## 2019-11-01 RX ORDER — CELECOXIB 200 MG/1
1 CAPSULE ORAL
Qty: 28 | Refills: 0
Start: 2019-11-01 | End: 2019-11-14

## 2019-11-01 RX ORDER — ACETAMINOPHEN 500 MG
3 TABLET ORAL
Qty: 0 | Refills: 0 | DISCHARGE
Start: 2019-11-01

## 2019-11-01 RX ADMIN — Medication 325 MILLIGRAM(S): at 17:43

## 2019-11-01 RX ADMIN — TRAMADOL HYDROCHLORIDE 50 MILLIGRAM(S): 50 TABLET ORAL at 09:24

## 2019-11-01 RX ADMIN — Medication 975 MILLIGRAM(S): at 17:43

## 2019-11-01 RX ADMIN — TRAMADOL HYDROCHLORIDE 50 MILLIGRAM(S): 50 TABLET ORAL at 10:16

## 2019-11-01 RX ADMIN — Medication 975 MILLIGRAM(S): at 06:21

## 2019-11-01 RX ADMIN — CELECOXIB 200 MILLIGRAM(S): 200 CAPSULE ORAL at 05:21

## 2019-11-01 RX ADMIN — CELECOXIB 200 MILLIGRAM(S): 200 CAPSULE ORAL at 17:44

## 2019-11-01 RX ADMIN — CELECOXIB 200 MILLIGRAM(S): 200 CAPSULE ORAL at 06:21

## 2019-11-01 RX ADMIN — Medication 40 MILLIEQUIVALENT(S): at 09:24

## 2019-11-01 RX ADMIN — Medication 325 MILLIGRAM(S): at 05:21

## 2019-11-01 RX ADMIN — Medication 975 MILLIGRAM(S): at 05:21

## 2019-11-01 NOTE — PROGRESS NOTE ADULT - SUBJECTIVE AND OBJECTIVE BOX
SUBJECTIVE: Patient seen and examined. Pain controlled.     OBJECTIVE:  NAD  Vital Signs Last 24 Hrs  T(C): 36.8 (01 Nov 2019 05:05), Max: 36.8 (01 Nov 2019 05:05)  T(F): 98.2 (01 Nov 2019 05:05), Max: 98.2 (01 Nov 2019 05:05)  HR: 90 (01 Nov 2019 05:05) (84 - 99)  BP: 136/63 (01 Nov 2019 05:05) (135/70 - 156/70)  BP(mean): --  RR: 16 (01 Nov 2019 05:05) (15 - 17)  SpO2: 99% (01 Nov 2019 05:05) (96% - 99%)    Affected extremity:          Dressing: clean/dry/intact            Sensation: SILT         Motor exam: 5/5 TA/GS/EHL         warm well perfused; capillary refill <3 seconds                         9.1    14.58 )-----------( 256      ( 31 Oct 2019 07:52 )             28.9     10-31    140  |  104  |  18  ----------------------------<  165<H>  3.8   |  24  |  0.75    Ca    8.8      31 Oct 2019 07:52      I&O's Detail    30 Oct 2019 07:01  -  31 Oct 2019 07:00  --------------------------------------------------------  IN:    lactated ringers.: 1640 mL    Oral Fluid: 1240 mL  Total IN: 2880 mL    OUT:    Voided: 1800 mL  Total OUT: 1800 mL    Total NET: 1080 mL      31 Oct 2019 07:01  -  01 Nov 2019 06:33  --------------------------------------------------------  IN:    Oral Fluid: 1080 mL  Total IN: 1080 mL    OUT:    Voided: 1700 mL  Total OUT: 1700 mL    Total NET: -620 mL        A/P :  Pt is a 77yo Female s/p R TKA   -    Pain control  -    DVT ppx: SCD/ASA     -    Weight bearing status: WBAT   -    Physical Therapy  -    Dispo: Home    Jay Potts MD  Senior Orthopaedic Resident  Orthopaedic Surgery

## 2019-11-01 NOTE — PROGRESS NOTE ADULT - SUBJECTIVE AND OBJECTIVE BOX
Orthopaedic Surgery Progress Note    Post-operative day #2 s/p right total knee replacement     Subjective:     Patient seen and examined. Patient comfortable without complaints, pain controlled.  Denies chest pain, shortness of breath, nausea/vomiting, numbness/tingling.    Objective:    Vital Signs Last 24 Hrs  T(C): 36.8 (11-01-19 @ 05:05), Max: 36.8 (11-01-19 @ 05:05)  T(F): 98.2 (11-01-19 @ 05:05), Max: 98.2 (11-01-19 @ 05:05)  HR: 90 (11-01-19 @ 05:05) (90 - 90)  BP: 136/63 (11-01-19 @ 05:05) (136/63 - 136/63)  BP(mean): --  RR: 16 (11-01-19 @ 05:05) (16 - 16)  SpO2: 99% (11-01-19 @ 05:05) (99% - 99%)  AVSS    PE:  General: Patient alert and oriented, NAD  Dressing: Clean/dry/intact right knee  Pulses: DP pulse palpable RLE  Sensation: intact and equal to bilateral lower extremities   Motor: EHL/FHL/TA/GS 5/5 bilateral lower extremities                           8.7    10.48 )-----------( 242      ( 01 Nov 2019 06:39 )             27.8   01 Nov 2019 06:39    143    |  103    |  22     ----------------------------<  106    3.3     |  29     |  0.76     Ca    8.4        01 Nov 2019 06:39        A/P: 78yFemale POD#2 s/p right TKR   1. Pain control as needed  2. DVT prophylaxis: ASA  3. PT, weight-bearing status: WBAT  4. Dispo: home with home PT pending PT clearance  5. K 3.3 this AM - 40meq KCl given     Ortho Pager [phone]

## 2019-11-05 DIAGNOSIS — I10 ESSENTIAL (PRIMARY) HYPERTENSION: ICD-10-CM

## 2019-11-05 DIAGNOSIS — M25.561 PAIN IN RIGHT KNEE: ICD-10-CM

## 2019-11-05 DIAGNOSIS — E66.9 OBESITY, UNSPECIFIED: ICD-10-CM

## 2019-11-05 DIAGNOSIS — Z96.652 PRESENCE OF LEFT ARTIFICIAL KNEE JOINT: ICD-10-CM

## 2019-11-05 DIAGNOSIS — M17.11 UNILATERAL PRIMARY OSTEOARTHRITIS, RIGHT KNEE: ICD-10-CM

## 2019-11-12 PROCEDURE — 85025 COMPLETE CBC W/AUTO DIFF WBC: CPT

## 2019-11-12 PROCEDURE — C1713: CPT

## 2019-11-12 PROCEDURE — 85027 COMPLETE CBC AUTOMATED: CPT

## 2019-11-12 PROCEDURE — 97116 GAIT TRAINING THERAPY: CPT

## 2019-11-12 PROCEDURE — 97161 PT EVAL LOW COMPLEX 20 MIN: CPT

## 2019-11-12 PROCEDURE — 80048 BASIC METABOLIC PNL TOTAL CA: CPT

## 2019-11-12 PROCEDURE — 73560 X-RAY EXAM OF KNEE 1 OR 2: CPT

## 2019-11-12 PROCEDURE — 36415 COLL VENOUS BLD VENIPUNCTURE: CPT

## 2019-11-12 PROCEDURE — C1776: CPT

## 2022-10-24 NOTE — PHYSICAL THERAPY INITIAL EVALUATION ADULT - GENERAL OBSERVATIONS, REHAB EVAL
Patient advised to call if any problems, questions, or concerns. Patient received semi-supine in no acute distress, +Telemetry, +SCDs, +Cryocuff, +IV. Cleared by TRINI Boone. Agreeable to PT.

## 2022-11-23 NOTE — PHYSICAL THERAPY INITIAL EVALUATION ADULT - SKIN INTEGRITY
VTE ppx:  Hold pharmacologic prophylaxis due to thrombocytopenia ACE wrap clean/dry/intact/surgical incision

## 2024-06-05 NOTE — PROGRESS NOTE ADULT - PROVIDER SPECIALTY LIST ADULT
Orthopedics [Right] : right hand dominant [FreeTextEntry1] : Patient presents for follow-up of left ring and middle trigger fingers and also trigger right ring finger.  Patient is also here with a new issue regarding left arm pain with no trauma.  Patient received her first left middle trigger injection on November 28, 2023 with no relief She also received her first left ring finger trigger injection on November 28, 2023 with no relief Patient received her first right ring finger trigger injection November 28, 2023 with no relief

## 2024-11-21 NOTE — PHYSICAL THERAPY INITIAL EVALUATION ADULT - GAIT DEVIATIONS NOTED, PT EVAL
What Type Of Note Output Would You Prefer (Optional)?: Bullet Format Hpi Title: Evaluation of a Skin Lesion How Severe Are Your Spot(S)?: mild Have Your Spot(S) Been Treated In The Past?: has not been treated decreased kanchan/decreased step length/decreased stride length